# Patient Record
Sex: FEMALE | Race: WHITE | NOT HISPANIC OR LATINO | URBAN - METROPOLITAN AREA
[De-identification: names, ages, dates, MRNs, and addresses within clinical notes are randomized per-mention and may not be internally consistent; named-entity substitution may affect disease eponyms.]

---

## 2019-04-15 ENCOUNTER — HOSPITAL ENCOUNTER (OUTPATIENT)
Dept: LAB | Facility: HOSPITAL | Age: 44
Discharge: HOME | End: 2019-04-15
Attending: INTERNAL MEDICINE

## 2019-04-15 LAB
ALBUMIN SERPL-MCNC: 4 G/DL (ref 3.5–5)
ALBUMIN/GLOB SERPL: 1.5 {RATIO} (ref 1.4–2.6)
ALP SERPL-CCNC: 81 U/L (ref 42–98)
ALT SERPL-CCNC: 24 U/L (ref 10–40)
AMYLASE SERPL-CCNC: 55 U/L (ref 30–110)
ANION GAP SERPL CALC-SCNC: 16 MMOL/L (ref 8–19)
AST SERPL-CCNC: 17 U/L (ref 15–50)
BASOPHILS # BLD AUTO: 0.09 10*3/UL (ref 0–0.2)
BASOPHILS NFR BLD AUTO: 1.2 % (ref 0–3)
BILIRUB SERPL-MCNC: 0.43 MG/DL (ref 0.2–1.3)
BUN SERPL-MCNC: 10 MG/DL (ref 5–25)
BUN/CREAT SERPL: 12 {RATIO} (ref 6–20)
CALCIUM SERPL-MCNC: 8.6 MG/DL (ref 8.7–10.4)
CHLORIDE SERPL-SCNC: 104 MMOL/L (ref 99–111)
CONV ABS IMM GRAN: 0.03 10*3/UL (ref 0–0.2)
CONV CO2: 24 MMOL/L (ref 22–32)
CONV IMMATURE GRAN: 0.4 % (ref 0–1.8)
CONV TOTAL PROTEIN: 6.7 G/DL (ref 6.3–8.2)
CREAT UR-MCNC: 0.82 MG/DL (ref 0.5–0.9)
DEPRECATED RDW RBC AUTO: 43.9 FL (ref 36.4–46.3)
EOSINOPHIL # BLD AUTO: 0.15 10*3/UL (ref 0–0.7)
EOSINOPHIL # BLD AUTO: 1.9 % (ref 0–7)
ERYTHROCYTE [DISTWIDTH] IN BLOOD BY AUTOMATED COUNT: 12.9 % (ref 11.7–14.4)
ERYTHROCYTE [SEDIMENTATION RATE] IN BLOOD: 2 MM/H (ref 0–20)
GFR SERPLBLD BASED ON 1.73 SQ M-ARVRAT: >60 ML/MIN/{1.73_M2}
GLOBULIN UR ELPH-MCNC: 2.7 G/DL (ref 2–3.5)
GLUCOSE SERPL-MCNC: 87 MG/DL (ref 65–99)
HBA1C MFR BLD: 14.2 G/DL (ref 12–16)
HCT VFR BLD AUTO: 45.3 % (ref 37–47)
LIPASE SERPL-CCNC: 33 U/L (ref 5–51)
LYMPHOCYTES # BLD AUTO: 1.86 10*3/UL (ref 1–5)
MCH RBC QN AUTO: 28.9 PG (ref 27–31)
MCHC RBC AUTO-ENTMCNC: 31.3 G/DL (ref 33–37)
MCV RBC AUTO: 92.1 FL (ref 81–99)
MONOCYTES # BLD AUTO: 0.47 10*3/UL (ref 0.2–1.2)
MONOCYTES NFR BLD AUTO: 6 % (ref 3–10)
NEUTROPHILS # BLD AUTO: 5.21 10*3/UL (ref 2–8)
NEUTROPHILS NFR BLD AUTO: 66.7 % (ref 30–85)
NRBC CBCN: 0 % (ref 0–0.7)
OSMOLALITY SERPL CALC.SUM OF ELEC: 288 MOSM/KG (ref 273–304)
PLATELET # BLD AUTO: 350 10*3/UL (ref 130–400)
PMV BLD AUTO: 11.7 FL (ref 9.4–12.3)
POTASSIUM SERPL-SCNC: 4 MMOL/L (ref 3.5–5.3)
RBC # BLD AUTO: 4.92 10*6/UL (ref 4.2–5.4)
SODIUM SERPL-SCNC: 140 MMOL/L (ref 135–147)
VARIANT LYMPHS NFR BLD MANUAL: 23.8 % (ref 20–45)
WBC # BLD AUTO: 7.81 10*3/UL (ref 4.8–10.8)

## 2019-04-19 ENCOUNTER — CONVERSION ENCOUNTER (OUTPATIENT)
Dept: GASTROENTEROLOGY | Facility: CLINIC | Age: 44
End: 2019-04-19

## 2019-04-19 ENCOUNTER — OFFICE VISIT CONVERTED (OUTPATIENT)
Dept: GASTROENTEROLOGY | Facility: CLINIC | Age: 44
End: 2019-04-19
Attending: NURSE PRACTITIONER

## 2019-04-23 ENCOUNTER — HOSPITAL ENCOUNTER (OUTPATIENT)
Dept: LAB | Facility: HOSPITAL | Age: 44
Discharge: HOME | End: 2019-04-23

## 2019-04-26 LAB
CONV CELIAC DISEASE AB-IGA: 107 MG/DL (ref 68–408)
TTG IGA SER-ACNC: 1 U/ML (ref 0–3)

## 2019-05-06 ENCOUNTER — HOSPITAL ENCOUNTER (OUTPATIENT)
Dept: OTHER | Facility: HOSPITAL | Age: 44
Discharge: HOME | End: 2019-05-06
Attending: OBSTETRICS & GYNECOLOGY

## 2019-05-08 LAB — BACTERIA SPEC AEROBE CULT: NORMAL

## 2019-06-13 ENCOUNTER — CONVERSION ENCOUNTER (OUTPATIENT)
Dept: CARDIOLOGY | Facility: CLINIC | Age: 44
End: 2019-06-13

## 2019-06-13 ENCOUNTER — OFFICE VISIT CONVERTED (OUTPATIENT)
Dept: CARDIOLOGY | Facility: CLINIC | Age: 44
End: 2019-06-13
Attending: INTERNAL MEDICINE

## 2019-08-06 ENCOUNTER — HOSPITAL ENCOUNTER (OUTPATIENT)
Dept: DIABETES SERVICES | Facility: HOSPITAL | Age: 44
Setting detail: RECURRING SERIES
Discharge: HOME | End: 2019-08-31

## 2019-08-09 ENCOUNTER — HOSPITAL ENCOUNTER (OUTPATIENT)
Dept: GASTROENTEROLOGY | Facility: HOSPITAL | Age: 44
Setting detail: HOSPITAL OUTPATIENT SURGERY
Discharge: HOME | End: 2019-08-09
Attending: INTERNAL MEDICINE

## 2019-08-09 LAB
GLUCOSE BLD-MCNC: 75 MG/DL (ref 65–99)
HCG UR QL: NEGATIVE

## 2019-08-21 ENCOUNTER — HOSPITAL ENCOUNTER (OUTPATIENT)
Dept: GENERAL RADIOLOGY | Facility: HOSPITAL | Age: 44
Discharge: HOME | End: 2019-08-21
Attending: INTERNAL MEDICINE

## 2019-08-23 ENCOUNTER — HOSPITAL ENCOUNTER (OUTPATIENT)
Dept: CARDIOLOGY | Facility: HOSPITAL | Age: 44
Discharge: HOME | End: 2019-08-23
Attending: INTERNAL MEDICINE

## 2019-10-22 ENCOUNTER — OFFICE VISIT CONVERTED (OUTPATIENT)
Dept: ONCOLOGY | Facility: HOSPITAL | Age: 44
End: 2019-10-22
Attending: INTERNAL MEDICINE

## 2019-10-22 ENCOUNTER — HOSPITAL ENCOUNTER (OUTPATIENT)
Dept: ONCOLOGY | Facility: HOSPITAL | Age: 44
Discharge: HOME | End: 2019-10-22
Attending: INTERNAL MEDICINE

## 2019-12-10 ENCOUNTER — HOSPITAL ENCOUNTER (OUTPATIENT)
Dept: LAB | Facility: HOSPITAL | Age: 44
Discharge: HOME | End: 2019-12-10
Attending: INTERNAL MEDICINE

## 2019-12-10 LAB
ALBUMIN SERPL-MCNC: 4.2 G/DL (ref 3.5–5)
ALBUMIN/GLOB SERPL: 1.6 {RATIO} (ref 1.4–2.6)
ALP SERPL-CCNC: 80 U/L (ref 42–98)
ALT SERPL-CCNC: 26 U/L (ref 10–40)
ANION GAP SERPL CALC-SCNC: 22 MMOL/L (ref 8–19)
AST SERPL-CCNC: 17 U/L (ref 15–50)
BASOPHILS # BLD AUTO: 0.07 10*3/UL (ref 0–0.2)
BASOPHILS NFR BLD AUTO: 1.1 % (ref 0–3)
BILIRUB SERPL-MCNC: 0.57 MG/DL (ref 0.2–1.3)
BUN SERPL-MCNC: 13 MG/DL (ref 5–25)
BUN/CREAT SERPL: 15 {RATIO} (ref 6–20)
CALCIUM SERPL-MCNC: 9.4 MG/DL (ref 8.7–10.4)
CHLORIDE SERPL-SCNC: 106 MMOL/L (ref 99–111)
CHOLEST SERPL-MCNC: 170 MG/DL (ref 107–200)
CHOLEST/HDLC SERPL: 3.7 {RATIO} (ref 3–6)
CONV ABS IMM GRAN: 0.02 10*3/UL (ref 0–0.2)
CONV CO2: 20 MMOL/L (ref 22–32)
CONV IMMATURE GRAN: 0.3 % (ref 0–1.8)
CONV TOTAL PROTEIN: 6.9 G/DL (ref 6.3–8.2)
CREAT UR-MCNC: 0.84 MG/DL (ref 0.5–0.9)
DEPRECATED RDW RBC AUTO: 41.6 FL (ref 36.4–46.3)
EOSINOPHIL # BLD AUTO: 0.2 10*3/UL (ref 0–0.7)
EOSINOPHIL # BLD AUTO: 3.1 % (ref 0–7)
ERYTHROCYTE [DISTWIDTH] IN BLOOD BY AUTOMATED COUNT: 12 % (ref 11.7–14.4)
GFR SERPLBLD BASED ON 1.73 SQ M-ARVRAT: >60 ML/MIN/{1.73_M2}
GLOBULIN UR ELPH-MCNC: 2.7 G/DL (ref 2–3.5)
GLUCOSE SERPL-MCNC: 84 MG/DL (ref 65–99)
HCT VFR BLD AUTO: 43.6 % (ref 37–47)
HDLC SERPL-MCNC: 46 MG/DL (ref 40–60)
HGB BLD-MCNC: 13.9 G/DL (ref 12–16)
LDLC SERPL CALC-MCNC: 93 MG/DL (ref 70–100)
LYMPHOCYTES # BLD AUTO: 1.6 10*3/UL (ref 1–5)
LYMPHOCYTES NFR BLD AUTO: 25.2 % (ref 20–45)
MCH RBC QN AUTO: 30 PG (ref 27–31)
MCHC RBC AUTO-ENTMCNC: 31.9 G/DL (ref 33–37)
MCV RBC AUTO: 94 FL (ref 81–99)
MONOCYTES # BLD AUTO: 0.47 10*3/UL (ref 0.2–1.2)
MONOCYTES NFR BLD AUTO: 7.4 % (ref 3–10)
NEUTROPHILS # BLD AUTO: 3.99 10*3/UL (ref 2–8)
NEUTROPHILS NFR BLD AUTO: 62.9 % (ref 30–85)
NRBC CBCN: 0 % (ref 0–0.7)
OSMOLALITY SERPL CALC.SUM OF ELEC: 297 MOSM/KG (ref 273–304)
PLATELET # BLD AUTO: 292 10*3/UL (ref 130–400)
PMV BLD AUTO: 11.5 FL (ref 9.4–12.3)
POTASSIUM SERPL-SCNC: 4 MMOL/L (ref 3.5–5.3)
RBC # BLD AUTO: 4.64 10*6/UL (ref 4.2–5.4)
SODIUM SERPL-SCNC: 144 MMOL/L (ref 135–147)
TRIGL SERPL-MCNC: 157 MG/DL (ref 40–150)
TSH SERPL-ACNC: 4.67 M[IU]/L (ref 0.27–4.2)
VLDLC SERPL-MCNC: 31 MG/DL (ref 5–37)
WBC # BLD AUTO: 6.35 10*3/UL (ref 4.8–10.8)

## 2020-01-22 ENCOUNTER — HOSPITAL ENCOUNTER (OUTPATIENT)
Dept: ONCOLOGY | Facility: HOSPITAL | Age: 45
Discharge: HOME | End: 2020-01-22
Attending: INTERNAL MEDICINE

## 2020-01-22 ENCOUNTER — OFFICE VISIT CONVERTED (OUTPATIENT)
Dept: ONCOLOGY | Facility: HOSPITAL | Age: 45
End: 2020-01-22
Attending: INTERNAL MEDICINE

## 2020-03-25 ENCOUNTER — TELEMEDICINE CONVERTED (OUTPATIENT)
Dept: GASTROENTEROLOGY | Facility: CLINIC | Age: 45
End: 2020-03-25
Attending: NURSE PRACTITIONER

## 2020-06-25 ENCOUNTER — TELEPHONE CONVERTED (OUTPATIENT)
Dept: GASTROENTEROLOGY | Facility: CLINIC | Age: 45
End: 2020-06-25
Attending: NURSE PRACTITIONER

## 2020-09-02 ENCOUNTER — HOSPITAL ENCOUNTER (OUTPATIENT)
Dept: LAB | Facility: HOSPITAL | Age: 45
Discharge: HOME | End: 2020-09-02
Attending: INTERNAL MEDICINE

## 2020-09-02 LAB
25(OH)D3 SERPL-MCNC: 33.7 NG/ML (ref 30–100)
ALBUMIN SERPL-MCNC: 4 G/DL (ref 3.5–5)
ALBUMIN/GLOB SERPL: 1.6 {RATIO} (ref 1.4–2.6)
ALP SERPL-CCNC: 93 U/L (ref 42–98)
ALT SERPL-CCNC: 21 U/L (ref 10–40)
ANION GAP SERPL CALC-SCNC: 16 MMOL/L (ref 8–19)
AST SERPL-CCNC: 20 U/L (ref 15–50)
BASOPHILS # BLD AUTO: 0.08 10*3/UL (ref 0–0.2)
BASOPHILS NFR BLD AUTO: 1 % (ref 0–3)
BILIRUB SERPL-MCNC: 0.29 MG/DL (ref 0.2–1.3)
BUN SERPL-MCNC: 11 MG/DL (ref 5–25)
BUN/CREAT SERPL: 14 {RATIO} (ref 6–20)
CALCIUM SERPL-MCNC: 9.5 MG/DL (ref 8.7–10.4)
CHLORIDE SERPL-SCNC: 104 MMOL/L (ref 99–111)
CHOLEST SERPL-MCNC: 182 MG/DL (ref 107–200)
CHOLEST/HDLC SERPL: 3.9 {RATIO} (ref 3–6)
CONV ABS IMM GRAN: 0.02 10*3/UL (ref 0–0.2)
CONV CO2: 24 MMOL/L (ref 22–32)
CONV IMMATURE GRAN: 0.3 % (ref 0–1.8)
CONV TOTAL PROTEIN: 6.5 G/DL (ref 6.3–8.2)
CREAT UR-MCNC: 0.8 MG/DL (ref 0.5–0.9)
DEPRECATED RDW RBC AUTO: 43.1 FL (ref 36.4–46.3)
EOSINOPHIL # BLD AUTO: 0.24 10*3/UL (ref 0–0.7)
EOSINOPHIL # BLD AUTO: 3.1 % (ref 0–7)
ERYTHROCYTE [DISTWIDTH] IN BLOOD BY AUTOMATED COUNT: 13.1 % (ref 11.7–14.4)
GFR SERPLBLD BASED ON 1.73 SQ M-ARVRAT: >60 ML/MIN/{1.73_M2}
GLOBULIN UR ELPH-MCNC: 2.5 G/DL (ref 2–3.5)
GLUCOSE SERPL-MCNC: 83 MG/DL (ref 65–99)
HCT VFR BLD AUTO: 44.3 % (ref 37–47)
HDLC SERPL-MCNC: 47 MG/DL (ref 40–60)
HGB BLD-MCNC: 13.8 G/DL (ref 12–16)
LDLC SERPL CALC-MCNC: 102 MG/DL (ref 70–100)
LYMPHOCYTES # BLD AUTO: 1.98 10*3/UL (ref 1–5)
LYMPHOCYTES NFR BLD AUTO: 25.2 % (ref 20–45)
MCH RBC QN AUTO: 28 PG (ref 27–31)
MCHC RBC AUTO-ENTMCNC: 31.2 G/DL (ref 33–37)
MCV RBC AUTO: 89.9 FL (ref 81–99)
MONOCYTES # BLD AUTO: 0.49 10*3/UL (ref 0.2–1.2)
MONOCYTES NFR BLD AUTO: 6.2 % (ref 3–10)
NEUTROPHILS # BLD AUTO: 5.04 10*3/UL (ref 2–8)
NEUTROPHILS NFR BLD AUTO: 64.2 % (ref 30–85)
NRBC CBCN: 0 % (ref 0–0.7)
OSMOLALITY SERPL CALC.SUM OF ELEC: 289 MOSM/KG (ref 273–304)
PLATELET # BLD AUTO: 347 10*3/UL (ref 130–400)
PMV BLD AUTO: 11.3 FL (ref 9.4–12.3)
POTASSIUM SERPL-SCNC: 4.1 MMOL/L (ref 3.5–5.3)
RBC # BLD AUTO: 4.93 10*6/UL (ref 4.2–5.4)
SODIUM SERPL-SCNC: 140 MMOL/L (ref 135–147)
TRIGL SERPL-MCNC: 164 MG/DL (ref 40–150)
TSH SERPL-ACNC: 3.11 M[IU]/L (ref 0.27–4.2)
VLDLC SERPL-MCNC: 33 MG/DL (ref 5–37)
WBC # BLD AUTO: 7.85 10*3/UL (ref 4.8–10.8)

## 2020-09-08 ENCOUNTER — OFFICE VISIT CONVERTED (OUTPATIENT)
Dept: ORTHOPEDIC SURGERY | Facility: CLINIC | Age: 45
End: 2020-09-08
Attending: ORTHOPAEDIC SURGERY

## 2020-09-14 ENCOUNTER — HOSPITAL ENCOUNTER (OUTPATIENT)
Dept: CARDIOLOGY | Facility: HOSPITAL | Age: 45
Discharge: HOME | End: 2020-09-14
Attending: INTERNAL MEDICINE

## 2020-11-10 ENCOUNTER — OFFICE VISIT CONVERTED (OUTPATIENT)
Dept: ORTHOPEDIC SURGERY | Facility: CLINIC | Age: 45
End: 2020-11-10
Attending: ORTHOPAEDIC SURGERY

## 2020-11-10 ENCOUNTER — CONVERSION ENCOUNTER (OUTPATIENT)
Dept: ORTHOPEDIC SURGERY | Facility: CLINIC | Age: 45
End: 2020-11-10

## 2021-03-24 ENCOUNTER — OFFICE VISIT CONVERTED (OUTPATIENT)
Dept: GASTROENTEROLOGY | Facility: CLINIC | Age: 46
End: 2021-03-24
Attending: NURSE PRACTITIONER

## 2021-05-10 NOTE — H&P
History and Physical      Patient Name: Norma Cabrales   Patient ID: [id number]   Sex: Female   YOB: 1975    Primary Care Provider: Rajeev Bello MD   Referring Provider: Shwetha INFANTE    Visit Date: September 8, 2020    Provider: Casey Jang MD   Location: [facility]   Location Address: [address] [place] [address]  [id number]   Location Phone: [phone]          Chief Complaint  · Left wrist cyst pain       History Of Present Illness  Norma Cabrales is a 44 year old /White female who presents today to [facility].      Patient presents here today for evaluation of her Left wrist. Patient has a cyst on her wrist for about a year.  She states she has 5 blood clots in her left arm from a previous medical procedure.  She has been cleared from a blood specialists and is not taking any blood thinner for about 6 months. She otherwise is feeling well, no other complaints at this time.       Past Medical History  Abdominal pain; Blood Diseases; Bowel Disease; Irritable bowel syndrome; Protein C deficiency; Seasonal allergies; Seizures         Past Surgical History  Cholecystectomy; Colonoscopy; EGD; Gallbladder         Medication List  Amitiza 8 mcg oral capsule; aspirin 81 mg Oral Tablet, Chewable; Benadryl 25 mg oral capsule; folic acid 400 mcg oral tablet; hyoscyamine sulfate 0.375 mg oral tablet extended release 12 hr; lansoprazole 15 mg oral capsule,delayed release(DR/EC); Metamucil 0.4 gram oral capsule; Vitamin B-12 1,000 mcg/mL oral drops         Allergy List  NO KNOWN DRUG ALLERGIES       Allergies Reconciled  Family Medical History  Cancer, Unspecified; Diabetes, unspecified type; Breast Neoplasm; Kidney Cancer; No family history of colorectal cancer; Family history of Arthritis         Social History  Alcohol (Never); Alcohol Use (Never); Claustophobic (Unknown); lives with children; lives with spouse; ; .; Recreational  "Drug Use (Never); Tobacco (Never); Working         Review of Systems  · Constitutional  o Denies  o : fever, chills, weight loss  · Cardiovascular  o Denies  o : chest pain, shortness of breath  · Gastrointestinal  o Denies  o : liver disease, heartburn, nausea, blood in stools  · Genitourinary  o Denies  o : painful urination, blood in urine  · Integument  o Denies  o : rash, itching  · Neurologic  o Denies  o : headache, weakness, loss of consciousness  · Musculoskeletal  o Denies  o : painful, swollen joints  · Psychiatric  o Denies  o : drug/alcohol addiction, anxiety, depression      Vitals  Date Time BP Position Site L\R Cuff Size HR RR TEMP (F) WT  HT  BMI kg/m2 BSA m2 O2 Sat HC       09/08/2020 09:50 AM      80 - R   158lbs 6oz 5'  6\" 25.56 1.83 98 %          Physical Examination  · Constitutional  o Appearance  o : well developed, well-nourished, no obvious deformities present  · Head and Face  o Head  o :   § Inspection  § : normocephalic  o Face  o :   § Inspection  § : no facial lesions  · Eyes  o Conjunctivae  o : conjunctivae normal  o Sclerae  o : sclerae white  · Ears, Nose, Mouth and Throat  o Ears  o :   § External Ears  § : appearance within normal limits  § Hearing  § : intact  o Nose  o :   § External Nose  § : appearance normal  · Neck  o Inspection/Palpation  o : normal appearance  o Range of Motion  o : full range of motion  · Respiratory  o Respiratory Effort  o : breathing unlabored  o Inspection of Chest  o : normal appearance  o Auscultation of Lungs  o : no audible wheezing or rales  · Cardiovascular  o Heart  o : regular rate  · Gastrointestinal  o Abdominal Examination  o : soft and non-tender  · Skin and Subcutaneous Tissue  o General Inspection  o : intact, no rashes  · Psychiatric  o General  o : Alert and oriented x3  o Judgement and Insight  o : judgment and insight intact  o Mood and Affect  o : mood normal, affect appropriate  · Left Wrist  o Inspection  o : ganglion cyst 2cm by " 1 cm, nontender and mobile. mild pain with extension, flexion 75 degrees; extension: 85 degrees. Neurovascularly intact. skin intact.   · Injection Note/Aspiration Note  o Site  o : left wrist  o Procedure  o : Procedure: After educating the patient, patient gave consent for procedure. After using Chloraprep, the joint space was injected. The patient tolerated the procedure well.  o Medication  o : left wrist aspirated prior to injecting 80 mg of DepoMedrol with 1cc of 1% Lidocaine          Assessment  · Left wrist pain     719.43/M25.532  · Ganglion cyst of wrist, left     727.41/M67.432      Plan  · Orders  o Depo-Medrol injection 80mg () - - 09/08/2020   Lot [id number] Exp 07 2021 [employer] Administered by BONIFACIO NOYOLA MD  o Arthrocentesis of wrist (20605) - - 09/08/2020   Lot [id number] Exp 08 01 2021 [employer] Administered by BONIFACIO NOYOLA MD  · Medications  o Medications have been Reconciled  o Transition of Care or Provider Policy  · Instructions  o Reviewed the patient's Past Medical, Social, and Family history as well as the ROS at today's visit, no changes.  o Call or return if worsening symptoms.  o Follow up in 6 weeks.  o The above service was scribed by Cherise Galvez on my behalf and I attest to the accuracy of the note. jsb  o Discussed conservative treatment with the patient involving aspiration and injection vs operative treatment. Due to her blood clots, we recommend aspiration and injections before excision of ganglion cyst. Discussed the risks and benefits of aspiration and steroid injection. The patient expressed understanding and wished to proceed with aspiration and injection of the cyst. The patient will follow up in 6 weeks with us to recheck.   o Electronically Identified Patient Education Materials Provided Electronically            Electronically Signed by: Cherise Galvez MA -Author on September 9, 2020 11:04:03 AM  Electronically Co-signed by: Bonifacio GARCIAS  MD Suhail -Reviewer on September 9, 2020 10:14:50 PM

## 2021-05-12 ENCOUNTER — HOSPITAL ENCOUNTER (OUTPATIENT)
Dept: NUCLEAR MEDICINE | Facility: HOSPITAL | Age: 46
Discharge: HOME | End: 2021-05-12
Attending: NURSE PRACTITIONER

## 2021-05-13 NOTE — PROGRESS NOTES
"   Progress Note      Patient Name: Norma Cabrales   Patient ID: [id number]   Sex: Female   YOB: 1975    Primary Care Provider: Rajeev Bello MD   Referring Provider: Shwetha INFANTE    Visit Date: November 10, 2020    Provider: Casey Jang MD   Location: [facility]   Location Address: [address] [place] [address]  [id number]   Location Phone: [phone]          Chief Complaint  · Left wrist      History Of Present Illness  Norma Cabrales is a 44 year old /White female who presents today to [facility]. Patient presents for follow-up evaluation of left wrist ganglion cyst. Patient was evaluated on September 8 when she had a aspiration and injection of the left wrist dorsal ganglion cyst. Patient states the ganglion cyst has been there for over a year, she states the aspiration injection helped and it is reduced in size but states that about a week after the aspiration and injection she states it started coming back. Patient states that smaller than it used to be but still continues to grow. Patient states she had a colonoscopy and when she had the procedure she had swelling to her left upper extremity and was found to have blood clots in her left upper extremity. Patient denies being on a blood thinner at this time. Patient denies pain in the wrist but states it is \"annoying\" at times.       Past Medical History  Abdominal pain; Blood Diseases; Bowel Disease; Irritable bowel syndrome; Protein C deficiency; Seasonal allergies; Seizures         Past Surgical History  Cholecystectomy; Colonoscopy; EGD; Gallbladder         Medication List  Amitiza 8 mcg oral capsule; aspirin 81 mg Oral Tablet, Chewable; Benadryl 25 mg oral capsule; folic acid 400 mcg oral tablet; hyoscyamine sulfate 0.375 mg oral tablet extended release 12 hr; lansoprazole 15 mg oral capsule,delayed release(DR/EC); Metamucil 0.4 gram oral capsule; Vitamin B-12 1,000 mcg/mL oral " "drops         Allergy List  NO KNOWN DRUG ALLERGIES       Allergies Reconciled  Family Medical History  Cancer, Unspecified; Diabetes, unspecified type; Breast Neoplasm; Kidney Cancer; No family history of colorectal cancer; Family history of Arthritis         Social History  Alcohol (Never); Alcohol Use (Never); Claustophobic (Unknown); lives with children; lives with spouse; ; .; Recreational Drug Use (Never); Tobacco (Never); Working         Review of Systems  · Constitutional  o Denies  o : fever, chills, weight loss  · Cardiovascular  o Denies  o : chest pain, shortness of breath  · Gastrointestinal  o Denies  o : liver disease, heartburn, nausea, blood in stools  · Genitourinary  o Denies  o : painful urination, blood in urine  · Integument  o Denies  o : rash, itching  · Neurologic  o Denies  o : headache, weakness, loss of consciousness  · Musculoskeletal  o Denies  o : painful, swollen joints  · Psychiatric  o Denies  o : drug/alcohol addiction, anxiety, depression      Vitals  Date Time BP Position Site L\R Cuff Size HR RR TEMP (F) WT  HT  BMI kg/m2 BSA m2 O2 Sat FR L/min FiO2        11/10/2020 03:01 PM         145lbs 0oz 5'  6.25\" 23.23 1.75             Physical Examination  · Constitutional  o Appearance  o : well developed, well-nourished, no obvious deformities present  · Head and Face  o Head  o :   § Inspection  § : normocephalic  o Face  o :   § Inspection  § : no facial lesions  · Eyes  o Conjunctivae  o : conjunctivae normal  o Sclerae  o : sclerae white  · Ears, Nose, Mouth and Throat  o Ears  o :   § External Ears  § : appearance within normal limits  § Hearing  § : intact  o Nose  o :   § External Nose  § : appearance normal  · Neck  o Inspection/Palpation  o : normal appearance  o Range of Motion  o : full range of motion  · Respiratory  o Respiratory Effort  o : breathing unlabored  o Inspection of Chest  o : normal appearance  o Auscultation of Lungs  o : no audible wheezing " or rales  · Cardiovascular  o Heart  o : regular rate  · Gastrointestinal  o Abdominal Examination  o : soft and non-tender  · Skin and Subcutaneous Tissue  o General Inspection  o : intact, no rashes  · Psychiatric  o General  o : Alert and oriented x3  o Judgement and Insight  o : judgment and insight intact  o Mood and Affect  o : mood normal, affect appropriate  · Left Wrist  o Inspection  o : ganglion cyst 1 cm x 1 cm at the dorsum of the wrist, nontender and mobile. mild pain with extension, flexion 75 degrees; extension: 85 degrees. Neurovascularly intact. skin intact.          Assessment  · Left Wrist Ganglion cyst     727.43/M67.40      Plan  · Instructions  o Reviewed the patient's Past Medical, Social, and Family history as well as the ROS at today's visit, no changes.  o Call or return if worsening symptoms.  o Follow up as needed.  o The above service was scribed by Omero Zamora PA-C on my behalf and I attest to the accuracy of the note. jsb  o Discussed diagnosis and treatment options, patient was advised to talk with the hematologist for clearance for possible surgery. Patient states she will continue to watch it and follow-up as needed when she decides what she wants to do.            Electronically Signed by: Arya Zamora PA-C -Author on November 10, 2020 05:21:27 PM  Electronically Co-signed by: Casey Jang MD -Reviewer on November 10, 2020 08:53:13 PM

## 2021-05-13 NOTE — PROGRESS NOTES
Progress Note      Patient Name: Norma Cabrales   Patient ID: [id number]   Sex: Female   YOB: 1975    Primary Care Provider: Rajeev Bello MD   Referring Provider: Shwetha INFANTE    Visit Date: June 25, 2020    Provider: NARESH Reed   Location: [facility]   Location Address: [address] [place] [address]  [id number]   Location Phone: [phone]          Chief Complaint  · IBS follow up       History Of Present Illness  TELEHEALTH TELEPHONE VISIT  Chief Complaint: PT states it's a follow up IBS. No issue at this time.   Norma Cabrales is a 44 year old /White female who is presenting for evaluation via telehealth telephone visit. Verbal consent obtained before beginning visit.   Provider spent 14 minutes with the patient during the telehealth visit.   The following staff were present during this visit: Hayde RODRIGUEZ; Demarcus Gloria MA   Past Medical History/ Overview of Patient Symptoms     Due to the national emergency of COVID-19, all visits are being performed via telehealth where possible.    Patient initially presented April 2019 with abdominal pain, reported intolerance to gluten, dairy, yeast; patient had recent labs that were negative, patient also reported history of IBS C and that probiotics and MiraLAX worsen symptoms. Pt had CT 9/2018 that was negative, done for chronic sx of abd pain.   Celiac serology 4/23/2019 negative  PMH sig for Protein C deficiency--was put on Eliquis after developing clot in left arm after scopes.     EGD colonoscopy 8/9/2019: Medium-sized hiatal hernia, otherwise negative EGDduodenal biopsies with mildly increased intraepithelial lymphocytes, nonspecific findings, stomach biopsy with chronic inactive gastritis, esophageal biopsy with chronic inflammation; good prep, negative colonoscopy    Patient was last seen March 2020 complaining of chronic abdominal pain and spasms in her colon, complained of  "constipation and hard stools.  Hyoscyamine was prescribed and Colace.  Pt states hyoscyamine has helped, describes still spasms and \"insides vibrating\" pain usually above umbilicus and can radiate to LLQ, states bowels more regular w Colace. Tried Benefiber felt made her sick, doing Metamucil tablets and tolerates better. Pt states she missed 1 dose of Colace and abd pain really increased. She is taking Hyoscyamine 0.375 scheduled BID. Still w some discomfort but better.       Past Medical History  Abdominal pain; Irritable bowel syndrome; Protein C deficiency; Seizures         Past Surgical History  Cholecystectomy; Colonoscopy; EGD         Medication List  Name Date Started Instructions   aspirin 81 mg Oral Tablet, Chewable  chew 1 tablet (81 mg) by oral route once daily   Benadryl 25 mg oral capsule  take 1-2 capsules by oral route daily    mg oral capsule 03/25/2020 take 1 capsule (100 mg) by oral route 2 times per day for 30 days   Eliquis 5 mg oral tablet  Follow instructions from provider   folic acid 400 mcg oral tablet  take 1 tablet (0.4 mg) by oral route once daily   hyoscyamine sulfate 0.375 mg oral tablet extended release 12 hr 06/25/2020 take 1 tablet (0.375 mg) by oral route every 12 hours for 30 days   lansoprazole 15 mg oral capsule,delayed release(DR/EC)  take 1 capsule by oral route daily   Metamucil 0.4 gram oral capsule  --    Vitamin B-12 1,000 mcg/mL oral drops  take 1 drop by oral route daily         Allergy List  NO KNOWN DRUG ALLERGIES       Allergies Reconciled  Family Medical History  Breast Neoplasm; Kidney Cancer; No family history of colorectal cancer         Social History  Alcohol (Never); lives with children; lives with spouse; ; Tobacco (Never); Working             Assessment  · Irritable bowel syndrome     564.1/K58.9  · Constipation     564.00/K59.00      Plan  · Medications  o Amitiza 8 mcg oral capsule   SIG: take 1 capsule (8 mcg) by oral route 2 times per day " with food and water for 30 days   DISP: (60) capsules with 3 refills  Prescribed on 06/25/2020     o hyoscyamine sulfate 0.375 mg oral tablet extended release 12 hr   SIG: take 1 tablet (0.375 mg) by oral route every 12 hours for 30 days   DISP: (60) tablets with 5 refills  Refilled on 06/25/2020     o  mg oral capsule   SIG: take 1 capsule (100 mg) by oral route 2 times per day for 30 days   DISP: (60) capsules with 3 refills  Discontinued on 06/25/2020     o Medications have been Reconciled  o Transition of Care or Provider Policy  · Instructions  o f/u 3 months  o As pt had an improvement in pain w Colace and w Hyoscyamine, I think she would really improve with a med for IBS-C. D/W pt today trying Amitiza, she may continue to use Hyoscyamine as needed.            Electronically Signed by: NARESH Reed -Author on June 25, 2020 10:22:12 AM

## 2021-05-14 VITALS
SYSTOLIC BLOOD PRESSURE: 110 MMHG | HEART RATE: 75 BPM | WEIGHT: 158 LBS | TEMPERATURE: 98.2 F | DIASTOLIC BLOOD PRESSURE: 80 MMHG | BODY MASS INDEX: 25.39 KG/M2 | HEIGHT: 66 IN

## 2021-05-14 VITALS — HEART RATE: 80 BPM | OXYGEN SATURATION: 98 % | BODY MASS INDEX: 25.45 KG/M2 | WEIGHT: 158.37 LBS | HEIGHT: 66 IN

## 2021-05-14 VITALS — WEIGHT: 145 LBS | HEIGHT: 66 IN | BODY MASS INDEX: 23.3 KG/M2

## 2021-05-14 NOTE — PROGRESS NOTES
"   Progress Note      Patient Name: Norma Cabrales   Patient ID: [id number]   Sex: Female   YOB: 1975    Primary Care Provider: Rajeev Bello MD   Referring Provider: Shwetha INFANTE    Visit Date: March 24, 2021    Provider: NARESH Reed   Location: [facility]   Location Address: [address] [place] [address]  [id number]   Location Phone: [phone]          Chief Complaint  · Dysphagia  · Chest pain      History Of Present Illness  Norma Cabrales is a 45 year old /White female who presents to the office today.      Patient initially presented April 2019 with abdominal pain, reported intolerance to gluten, dairy, yeast; patient had recent labs that were negative, patient also reported history of IBS C and that probiotics and MiraLAX worsen symptoms. Pt had CT 9/2018 that was negative, done for chronic sx of abd pain.   Celiac serology 4/23/2019 negative  PMH sig for Protein C deficiency--was put on Eliquis after developing clot in left arm after scopes.     EGD colonoscopy 8/9/2019: Medium-sized hiatal hernia, otherwise negative EGDduodenal biopsies with mildly increased intraepithelial lymphocytes, nonspecific findings, stomach biopsy with chronic inactive gastritis, esophageal biopsy with chronic inflammation; good prep, negative colonoscopy      Pt was last seen June 2020 reported better w Colace and Hyoscyamine. Amitiza 8 Rx'd, pt has not f/u since.    Today, pt states she is having more abd pain, feels different than before. c/o abd bloating/swelling that is not improving, pt states when clothes hit her stomach in epigastric area it hurts, all around her abd now at times. Feels chest area also sore. Feels sx worsened about a month ago. Denies acid reflux and HB sx. States when she swallows she has a hard time with initial swallow, she has to make conscious effort to swallow.   Pt states she has alwasy had \"digestive pain\" all day " "every day x years and waxes/wanes. I asked if pain related to meals and she says \"yes and no\" and has been taking Aleve.   Pt states she takes hyoscyamine once/day, 30 min before meal, feels she may need a little more often. States she has \"spasms\" describes as pulsating sx.  Pt states she never was able to get Amitiza with insurance issues. Having daily BM, states stool softener casued pain, states [place] #2-4 but does have pain w BM and straining. Pt has tried and failed Colace and Miralax.  Pt states off Eliquis now, cleared by hematology.       Past Medical History  Abdominal pain; Blood Diseases; Bowel Disease; Irritable bowel syndrome; Protein C deficiency; Seasonal allergies; Seizures         Past Surgical History  Cholecystectomy; Colonoscopy; EGD; Gallbladder         Medication List  Name Date Started Instructions   Amitiza 8 mcg oral capsule 03/24/2021 take 1 capsule (8 mcg) by oral route 2 times per day with food and water for 30 days   aspirin 81 mg Oral Tablet, Chewable  chew 1 tablet (81 mg) by oral route once daily   Benadryl 25 mg oral capsule  take 1-2 capsules by oral route daily   folic acid 400 mcg oral tablet  take 1 tablet (0.4 mg) by oral route once daily   hyoscyamine sulfate 0.375 mg oral tablet extended release 12 hr 06/25/2020 take 1 tablet (0.375 mg) by oral route every 12 hours for 30 days   lansoprazole 15 mg oral capsule,delayed release(DR/EC)  take 1 capsule by oral route daily   Vitamin B-12 1,000 mcg/mL oral drops  take 1 drop by oral route daily         Allergy List  NO KNOWN DRUG ALLERGIES       Allergies Reconciled  Family Medical History  Cancer, Unspecified; Diabetes, unspecified type; Breast Neoplasm; Kidney Cancer; No family history of colorectal cancer; Family history of Arthritis         Social History  Alcohol (Never); Alcohol Use (Never); Claustophobic (Unknown); lives with children; lives with spouse; ; .; Recreational Drug Use (Never); Tobacco (Never); " "Working         Review of Systems  · Constitutional  o Admits  o : good general health lately, no acute distress  · Gastrointestinal  o Denies  o : additional gastrointestinal symptoms except as noted in the HPI  · Psychiatric  o Admits  o : pleasant affect      Vitals  Date Time BP Position Site L\R Cuff Size HR RR TEMP (F) WT  HT  BMI kg/m2 BSA m2 O2 Sat FR L/min FiO2 HC       11/10/2020 03:01 PM         145lbs 0oz 5'  6.25\" 23.23 1.75       03/24/2021 09:41 /80 Sitting    75 - R  98.2 157lbs 16oz 5'  6.25\" 25.31 1.83             Physical Examination  · Constitutional  o Appearance  o : well developed, well-nourished, in no acute distress  · Head and Face  o Head  o :   § Inspection  § : atraumatic, normocephalic  · Eyes  o Sclerae  o : sclerae white, no sclerae icterus  · Neck  o Inspection/Palpation  o : supple  · Respiratory  o Respiratory Effort  o : breathing unlabored  o Inspection of Chest  o : normal appearance, no retractions  · Cardiovascular  o Peripheral Vascular System  o :   § Extremities  § : no cyanosis, clubbing or edema  · Gastrointestinal  o Abdominal Examination  o : soft, nontender to palpation--generalized tenderness  · Skin and Subcutaneous Tissue  o General Inspection  o : no lesions present, no rashes present  · Neurologic  o Mental Status Examination  o :   § Orientation  § : grossly oriented to person, place and time  § Speech/Language  § : communication ability within normal limits, voice quality normal, articulation of speech normal, no evidence of aphasia  § Attention  § : attention normal, concentration abilities normal  o Sensation  o : grossly intact  o Gait and Station  o :   § Gait Screening  § : normal gait  · Psychiatric  o General  o : Alert and oriented x3  o Mood and Affect  o : Mood and affect are appropriate to circumstances          Assessment  · Pre-op exam     V72.84/Z01.818  · Abdominal " pain     789.00/R10.9  worsening  · Constipation     564.00/K59.00  · Heartburn     787.1/R12  · Irritable bowel syndrome     564.1/K58.9      Plan  · Orders  o CBC with Auto Diff [facility] (31816) - - 03/24/2021  o CMP [facility] ([id number]) - - 03/24/2021  o Magnesium, serum (70262) - - 03/24/2021  o [facility] Pre-Op Covid-19 Screening (44468) - - 03/24/2021  · Medications  o Medications have been Reconciled  o Transition of Care or Provider Policy  · Instructions  o Please Sign Permit for: EGD  o Indication: dysphagia abd pain  o Surgical Risk and Benefits: Possible risks/complications, benefits, and alternatives to surgical or invasive procedure have been explained to patient and/or legal guardian; Patient has been evaluated and can tolerate anesthesia and/or sedation. Risks, benefits, and alternatives to anesthesia and sedation have been explained to patient and/or legal guardian.  o Follow Up after Procedure.  o Encouraged to follow-up with Primary Care Provider for preventative care.  o Patient was educated/instructed on their diagnosis, treatment and medications prior to discharge from the clinic today.  o Patient instructed to seek medical attention urgently for new or worsening symptoms.  o Pt concerned about EGD, hx phlebitis/DVT in left arm after EGD. Advised her to notify anesthesia at EGD. Also can notify hematology prior to EGD.  o Amitiza 8 mcg BID Rx'd from facesheet and samples given today   o Call if not improving and will do CT.             Electronically Signed by: NARESH Reed -Author on March 24, 2021 01:11:09 PM

## 2021-05-15 VITALS
DIASTOLIC BLOOD PRESSURE: 81 MMHG | WEIGHT: 149 LBS | SYSTOLIC BLOOD PRESSURE: 109 MMHG | HEART RATE: 78 BPM | TEMPERATURE: 98.5 F | BODY MASS INDEX: 23.95 KG/M2 | HEIGHT: 66 IN

## 2021-05-15 VITALS
BODY MASS INDEX: 23.3 KG/M2 | WEIGHT: 145 LBS | SYSTOLIC BLOOD PRESSURE: 100 MMHG | HEART RATE: 71 BPM | DIASTOLIC BLOOD PRESSURE: 76 MMHG | HEIGHT: 66 IN

## 2021-05-28 VITALS
RESPIRATION RATE: 16 BRPM | BODY MASS INDEX: 24.27 KG/M2 | HEIGHT: 66 IN | DIASTOLIC BLOOD PRESSURE: 68 MMHG | TEMPERATURE: 97.7 F | BODY MASS INDEX: 24.74 KG/M2 | TEMPERATURE: 98.4 F | RESPIRATION RATE: 14 BRPM | HEART RATE: 63 BPM | SYSTOLIC BLOOD PRESSURE: 102 MMHG | SYSTOLIC BLOOD PRESSURE: 107 MMHG | DIASTOLIC BLOOD PRESSURE: 72 MMHG | WEIGHT: 151.01 LBS | OXYGEN SATURATION: 100 % | HEART RATE: 68 BPM | WEIGHT: 152.12 LBS | OXYGEN SATURATION: 100 %

## 2021-05-28 NOTE — PROGRESS NOTES
Patient: DUNIA LEMUS     Acct: [id number]     Report: #[id number]  UNIT #: [id number]     : 1975    Encounter Date:2020  PRIMARY CARE: Remi Bello  ***Signed***  --------------------------------------------------------------------------------------------------------------------  NURSE INTAKE      Visit Type      Established Patient Visit            Chief Complaint      CLOTTING DISORDER FOLLOW UP            Referring Provider/Copies To      Referring Provider:  Remi Bello      Primary Care Provider:  Remi Bello      Copies To:   Remi Bello            History and Present Illness      HPI - Oncology Interim      Patient returns for follow-up of left upper extremity DVT.  This is felt to be     related to an endoscopic procedure that she had shortly before symptoms     developed.  She is now on anticoagulation for 6 months with Eliquis.  She does     have a history of protein C deficiency diagnosed in .  No other clotting     events.  She denies excessive bruising or bleeding with exception of heavier     menstrual cycle since she started the blood thinners.  Her energy level is good.     She notes some slight discomfort from the arm especially when exposed to cold.     given that this was a provoked clot, I think it would be reasonable to allow her    to come off of anticoagulation.  She can finish her current bottle and then     stop.  A baby aspirin daily is reasonable.  We discussed lifestyle modifications    such as routine exercise, foot and lower extremity exercises while sitting at     her work desk frequent stops during travel.  She can follow-up with her primary     care provider.  I am happy to see her back at any point should additional     questions or concerns arise.  If the menstrual cycle remains heavy, she should     follow-up with GYN.            PAST, FAMILY   Past Medical History      Past Medical History:  Bleeding Condition (Protein C deficiency), Seizures  (AS A    CHILD)            Past Surgical History      Biopsy, Cholecystectomy            Upper and lower endoscopy            Family History      Family History:  Kidney Cancer (FATHER)            Cousin with protein C deficiency            Social History      Marital Status:        Lives independently:  Yes      Number of Children:  2      Occupation:  [employer]            Tobacco Use      Tobacco status:  Never smoker            Alcohol Use      Alcohol intake:  None            Substance Use      Substance use:  Denies use            REVIEW OF SYSTEMS      General:  Admits: Fatigue      ENT:  Denies Headache      Cardiovascular:  Denies Chest Pain      Respiratory:  Denies: Productive Cough, Shortness of Air      Gastrointestinal:  Admits Constipation; Denies Diarrhea      Genitourinary:  Denies Blood in Urine      Musculoskeletal:  Denies Back Pain, Denies Muscle Pain      Neurologic:  Denies Numbness\Tingling            VITAL SIGNS AND SCORES      Vitals      Weight 152 lbs 1.878 oz / 69 kg      Temperature 98.4 F / 36.89 C - Temporal      Pulse 63      Respirations 16      Blood Pressure 102/68 Sitting, Left Arm      Pulse Oximetry 100%, RM AIR            Pain Score      Pain Scale Used:  Numerical      Pain Intensity:  0            Fatigue Score      Fatigue (0-10 scale):  4            EXAM      General Appearance:  Positive for: Alert, Oriented x3, Cooperative;          Negative for: Acute Distress      Respiratory:  Positive for: CTAB, Normal Respiratory Effort      Abdomen/Gastro:  Positive for: Normal Active Bowel Sounds, Soft;          Negative for: Tenderness      Cardiovascular:  Positive for: RRR;          Negative for: Gallop, Murmur, Peripheral Edema, Rub      Psychiatric:  Positive for: Appropriate Affect, Intact Judgement            PREVENTION      Hx Influenza Vaccination:  Yes      Date Influenza Vaccine Given:  Nov 1, 2019      2 or More Falls Past Year?:  No      Fall Past  Year with Injury?:  No      Hx Pneumococcal Vaccination:  No      Encouraged to follow-up with:  PCP regarding preventative exams.      Chart initiated by      SAHIL JAVIER MA            ALLERGY/MEDS      Allergies      Coded Allergies:             *No Known Allergies (Verified  Allergy, Unknown, 1/22/20)            Medications      Last Reconciled on 1/22/20 16:18 by PILO BAILEY      Apixaban (Eliquis) 5 Mg Tablet      5 MG PO BID for 30 Days, #60 TAB         Reported         10/22/19       Folic Acid (Folic Acid*) 0.4 Mg Tablet      1600 MCG PO BID, TAB         Reported         8/9/19       (Vitamin B12)   No Conflict Check               Reported         8/9/19       Omeprazole/Sodium Bicarbonate (Zegerid 40 Mg Capsule) 1 Cap Cap      1 CAP PO DAILY, CAP         Reported         3/5/12       Aspirin EC (Aspirin EC) 81 Mg Tabec      1 TAB PO QDAY, TAB 0 Refills         Reported         7/22/09      Medications Reviewed:  No Changes made to meds            IMPRESSION/PLAN      Diagnosis      Deep venous thrombosis of left upper extremity         Acute deep vein thrombosis (DVT) of left upper extremity, unspecified vein         Affected thrombotic vein of extremity: unspecified vein of extremity         Chronicity: acute            Notes      given that this was a provoked clot, I think it would be reasonable to allow her    to come off of anticoagulation.  She can finish her current bottle and then     stop.  A baby aspirin daily is reasonable.  We discussed lifestyle modifications    such as routine exercise, foot and lower extremity exercises while sitting at     her work desk frequent stops during travel.  She can follow-up with her primary     care provider.  I am happy to see her back at any point should additional     questions or concerns arise.  If the menstrual cycle remains heavy, she should     follow-up with GYN.            Patient Education            Apixaban      Patient Education Provided:  Yes             Electronically signed by PILO BAILEY  01/22/2020 16:18       Disclaimer: Converted document may not contain table formatting or lab diagrams. Please see [employer] for the authenticated document.

## 2021-05-28 NOTE — PROGRESS NOTES
Patient: DUNIA LEMUS     Acct: [id number]     Report: #[id number]  UNIT #: [id number]     : 1975    Encounter Date:10/22/2019  PRIMARY CARE: Remi Bello  ***Signed***  --------------------------------------------------------------------------------------------------------------------  NURSE INTAKE      Visit Type      New Patient Visit            Chief Complaint      GOT 5 BLOOD CLOTS L ARM FROM PROCEDURE            Referring Provider/Copies To      Referring Provider:  Remi Bello      Primary Care Provider:  Remi Bello            History and Present Illness      HPI - Hematology Interim      43-year-old white female with known protein C deficiency who presents today for     discussion of left upper extremity DVT.  Patient states that she had anesthesia     with endoscopy in August of this year.  A few days later while swimming she     noticed left arm pain and swelling.  She subsequently presented to the emergency    room on 2019.  Doppler ultrasound of the left arm demonstrated DVT.  She     was started on Eliquis which she has been on since that time.  She states the     arm is pretty much back to normal she does not have any pain, swelling or     redness.  She is taking her Eliquis as prescribed without difficulty such as exc    essive bruising or bleeding.  She has known protein C deficiency from being     tested in  after her cousin was found to have the same.  She has not had     prior finding events.  She is not a smoker.  She has completed childbearing and     is not on birth control or hormone replacement.            Most Recent Imaging Findings       LOCATION:[facility]     : 1975                                           --                                                                                           --                                      PROVIDERS                                            -- ADMITTING:     ATTENDING:                                             -- FAMILY:  Remi Bello   ORDERING:  NATHAN LEACH                                       -- OTHER:    DICTATING:  VINCENZO Mccain MD                                           --                                                                                           -- REQ #:[id number]   EXAM:VEUEL - VENOUS EVAL UPPER LIMITED                                 -- REASON FOR EXAM:  Swelling                                           -- REASON FOR VISIT:  LEFT ARM COMPLAINT                                           --                                                                                           -- *******Signed******                                              -- PROCEDURE:   VENOUS EVAL UPPER LIMITED                                           --                                                                                           --  COMPARISON:   None.                                           --                                                                                           --  INDICATIONS:   Swelling                                           --                                                                                           --  TECHNIQUE:   Color duplex Doppler ultrasound evaluation and analysis of       the deep venous system of                                          --                                      the                                                  --  upper extremity was performed in the usual manner.                                       --                                                                                           --  Right Impression:                                            --  Normal internal jugular and subclavian vein flow and compressibility                     --                                                                                           --  Left Impression:                                            --  There is absent  flow signals in the subclavian, axillary, and brachial       veins.  Imaging shows                                            --  intraluminal thrombus in the subclavian, axillary, and brachial veins       with non compressibility.                                             --  Flow signals are dampened at the radial ulnar position.  The radial       ulnar and cephalic vein show                                            --  normal compressibility.  The basilic vein shows thrombosis with       intraluminal thrombus and non                                            --  compressibility.  The internal jugular vein shows normal       compressibility.                                           --                                                                                           --  CONCLUSION:                                              --  1. There is an acute deep venous thrombosis in the left upper extremity       involving the subclavian,                                            --  axillary, and brachial veins.  There is superficial thrombophlebitis in       the basilic vein.                                           --                                                                                           --                                                                                           --                                                                                           --   Rolando Mccain MD                                                  --  Electronically Signed and Approved By: Rolando Mccain MD on 8/16/2019 at       20:46            PAST, FAMILY   Past Medical History      Past Medical History:  Bleeding Condition (Protein C deficiency), Seizures (AS A    CHILD)            Past Surgical History      Biopsy, Cholecystectomy            Upper and lower endoscopy            Family History      Family History:  Kidney Cancer (FATHER)            Cousin with protein C deficiency             Social History      Marital Status:        Lives independently:  Yes      Number of Children:  2      Occupation:  [employer]            Tobacco Use      Tobacco status:  Never smoker            Alcohol Use      Alcohol intake:  None            Substance Use      Substance use:  Denies use            REVIEW OF SYSTEMS      General:  Admits: Fatigue;          Denies: Weight Loss      Eye:  Denies Blurred Vision, Denies Corrective Lenses      ENT:  Denies Headache, Denies Hearing Loss; Admits Sore Throat      Cardiovascular:  Denies Chest Pain, Denies Palpitations      Respiratory:  Denies: Productive Cough, Shortness of Air      Gastrointestinal:  Admits Constipation; Denies Nausea/Vomiting, Denies Problem     Swallowing      Genitourinary:  Denies Blood in Urine, Denies Incontinence      Musculoskeletal:  Denies Back Pain; Admits Muscle Pain      Neurologic:  Denies Dizziness, Denies Numbness\Tingling      Hematologic/Lymphatic:  Denies Bruising, Denies Bleeding      Reproductive:  Admits: Heavy Periods, Still Menstruating            VITAL SIGNS AND SCORES      Vitals      Height 5 ft 5.75 in / 167 cm      Weight 151 lbs 0.242 oz / 68.5 kg      BSA 1.77 m2      BMI 24.6 kg/m2      Temperature 97.7 F / 36.5 C - Temporal      Pulse 68      Respirations 14      Blood Pressure 107/72 Sitting, Right Arm      Pulse Oximetry 100%, RM AIR            Pain Score      Pain Scale Used:  Numerical      Pain Intensity:  2            Fatigue Score      Fatigue (0-10 scale):  5            EXAM      General Appearance:  Positive for: Alert, Oriented x3, Cooperative;          Negative for: Acute Distress      Eye:  Positive for: Anicteric Sclerae, Moist Conjunctiva      Neck:  Positive for: Supple;          Negative for: JVD, Masses      Respiratory:  Positive for: CTAB, Normal Respiratory Effort      Abdomen/Gastro:  Positive for: Normal Active Bowel Sounds, Soft;          Negative for: Distention,  Hepatosplenomegaly, Tenderness      Cardiovascular:  Positive for: RRR;          Negative for: Gallop, Murmur, Peripheral Edema, Rub      Psychiatric:  Positive for: Appropriate Affect, Intact Judgement      Upper Extremities:  Negative for: Clubbing, Digital Cyanosis, Digital Ischemia      Other      2+ radial and ulnar pulses bilateral      Lymphatic:  Negative for: Axillary, Cervical, Infraclavicular, Supraclavicular            PREVENTION      Hx Influenza Vaccination:  No      2 or More Falls Past Year?:  No      Fall Past Year with Injury?:  No      Hx Pneumococcal Vaccination:  No      Encouraged to follow-up with:  PCP regarding preventative exams.      Chart initiated by      SAHIL JAVIER MA            ALLERGY/MEDS      Allergies      Coded Allergies:             *No Known Allergies (Verified  Allergy, Unknown, 10/22/19)            Medications            Apixaban (Eliquis) 5 Mg Tablet      5 MG PO BID for 30 Days, #60 TAB         Reported         10/22/19       Folic Acid (Folic Acid*) 0.4 Mg Tablet      1600 MCG PO BID, TAB         Reported         8/9/19       (Vitamin B12)   No Conflict Check               Reported         8/9/19       Omeprazole/Sodium Bicarbonate (Zegerid 40 Mg Capsule) 1 Cap Cap      1 CAP PO DAILY, CAP         Reported         3/5/12       Aspirin EC (Aspirin EC) 81 Mg Tabec      1 TAB PO QDAY, TAB 0 Refills         Reported         7/22/09      Medications Reviewed:  Changes made to meds            IMPRESSION/PLAN      Diagnosis      Deep venous thrombosis of left upper extremity         Acute deep vein thrombosis (DVT) of left upper extremity, unspecified vein -        I82.622         Affected thrombotic vein of extremity: unspecified vein of extremity         Chronicity: acute            Notes      Patient with known protein C deficiency.  She had a clot in the left arm which     appears to be a provoked event after anesthesia for endoscopies.  Given her     underlying  hypercoagulable state, I would recommend at least 6 months of     anticoagulation.  She has no other risk factors.  Given the provoked nature of     this clot, it would be reasonable to attempt to have let her come off of     anticoagulation although we discussed that any further clotting events would p    rompt long-term anticoagulation.  Eliquis is appropriate.  I will see her in 3     months to reassess.      New Medications      * Apixaban (Eliquis) 5 MG TABLET: 5 MG PO BID 30 Days #60            Patient Education      Patient Education Provided:  Yes            Electronically signed by PILO BALIEY  10/22/2019 16:16       Disclaimer: Converted document may not contain table formatting or lab diagrams. Please see [employer] for the authenticated document.

## 2021-06-02 ENCOUNTER — HOSPITAL ENCOUNTER (OUTPATIENT)
Dept: GENERAL RADIOLOGY | Facility: HOSPITAL | Age: 46
Discharge: HOME | End: 2021-06-02
Attending: NURSE PRACTITIONER

## 2021-06-08 RX ORDER — HYOSCYAMINE SULFATE EXTENDED-RELEASE 0.38 MG/1
375 TABLET ORAL EVERY 12 HOURS
COMMUNITY
Start: 2021-04-05 | End: 2021-06-09

## 2021-06-08 RX ORDER — LUBIPROSTONE 8 UG/1
8 CAPSULE, GELATIN COATED ORAL 2 TIMES DAILY
COMMUNITY
Start: 2021-05-18 | End: 2021-09-13 | Stop reason: SDUPTHER

## 2021-06-09 ENCOUNTER — OFFICE VISIT (OUTPATIENT)
Dept: GASTROENTEROLOGY | Facility: CLINIC | Age: 46
End: 2021-06-09

## 2021-06-09 VITALS
WEIGHT: 165 LBS | HEIGHT: 66 IN | SYSTOLIC BLOOD PRESSURE: 109 MMHG | DIASTOLIC BLOOD PRESSURE: 70 MMHG | TEMPERATURE: 97.2 F | BODY MASS INDEX: 26.52 KG/M2 | HEART RATE: 68 BPM

## 2021-06-09 DIAGNOSIS — R12 HEARTBURN: ICD-10-CM

## 2021-06-09 DIAGNOSIS — K58.1 IRRITABLE BOWEL SYNDROME WITH CONSTIPATION: Primary | ICD-10-CM

## 2021-06-09 DIAGNOSIS — R13.12 OROPHARYNGEAL DYSPHAGIA: ICD-10-CM

## 2021-06-09 PROCEDURE — 99212 OFFICE O/P EST SF 10 MIN: CPT | Performed by: INTERNAL MEDICINE

## 2021-06-09 RX ORDER — ASPIRIN 81 MG/1
TABLET ORAL EVERY 24 HOURS
COMMUNITY

## 2021-06-09 RX ORDER — HYOSCYAMINE SULFATE EXTENDED-RELEASE 0.38 MG/1
TABLET ORAL EVERY 12 HOURS SCHEDULED
COMMUNITY
End: 2021-09-13

## 2021-06-09 RX ORDER — LANSOPRAZOLE 15 MG/1
15 CAPSULE, DELAYED RELEASE ORAL DAILY
COMMUNITY

## 2021-06-09 NOTE — PROGRESS NOTES
"Chief Complaint  Last Office Visit (3.24.21), Cancelled EGD (Pt cx'd EGD for 4.28.21), Esophagram (6.2.21 - Small Hiatal Hernia), Gastric Emptying Study (5.12.21), EGD/Colonoscopy (8.19.19), and NO RECALLS    Subjective          Norma Cabrales presents to [facility]  History of Present Illness     Patient continues to have trouble swallowing. She reports that when she is chewing the food, she has to swallow the food a couple of times to get it down in to her esophagus. She denies choking or coughing. She does reports her eyes getting tired at the end of the day. There is no other weakness or numbness. She also complains of having epigastric pain and heartburn with certain foods. The pain is mild to moderate and non-radiating and lasts about 30 minutes. She has symptoms of crampy, lower abdominal pain with constipation. No bleeding or diarrhea.       Objective   Vital Signs:   /70 (BP Location: Right arm, Patient Position: Sitting, Cuff Size: Adult)   Pulse 68   Temp 97.2 °F (36.2 °C)   Ht 167.6 cm (66\")   Wt 74.8 kg (165 lb)   BMI 26.63 kg/m²     Physical Exam   Result Review :     CMP    CMP 9/2/20   Glucose 83   BUN 11   Creatinine 0.80   Sodium 140   Potassium 4.1   Chloride 104   Calcium 9.5   Albumin 4.0   Total Bilirubin 0.29   Alkaline Phosphatase 93   AST (SGOT) 20   ALT (SGPT) 21           CBC    CBC 9/2/20   WBC 7.85   RBC 4.93   Hemoglobin 13.8   Hematocrit 44.3   MCV 89.9   MCH 28.0   MCHC 31.2 (A)   RDW 13.1   Platelets 347   (A) Abnormal value                       Assessment and Plan    Diagnoses and all orders for this visit:    1. Irritable bowel syndrome with constipation (Primary)    2. Heartburn    3. Oropharyngeal dysphagia        Follow Up   Return in about 4 months (around 10/9/2021).  Patient was given instructions and counseling regarding her condition or for health maintenance advice. Please see specific information pulled into the AVS if " appropriate.   Refer patient to Dr. Simon to r/o myesthenia gravis.

## 2021-06-14 ENCOUNTER — TRANSCRIBE ORDERS (OUTPATIENT)
Dept: ADMINISTRATIVE | Facility: HOSPITAL | Age: 46
End: 2021-06-14

## 2021-06-14 DIAGNOSIS — Z12.31 SCREENING MAMMOGRAM, ENCOUNTER FOR: Primary | ICD-10-CM

## 2021-07-20 ENCOUNTER — HOSPITAL ENCOUNTER (OUTPATIENT)
Dept: MAMMOGRAPHY | Facility: HOSPITAL | Age: 46
Discharge: HOME | End: 2021-07-20
Admitting: OBSTETRICS & GYNECOLOGY

## 2021-07-20 DIAGNOSIS — Z12.31 SCREENING MAMMOGRAM, ENCOUNTER FOR: ICD-10-CM

## 2021-07-20 PROCEDURE — 77067 SCR MAMMO BI INCL CAD: CPT

## 2021-07-20 PROCEDURE — 77063 BREAST TOMOSYNTHESIS BI: CPT

## 2021-07-23 PROBLEM — E03.9 HYPOTHYROIDISM: Status: ACTIVE | Noted: 2021-07-23

## 2021-07-23 PROBLEM — E55.9 VITAMIN D DEFICIENCY: Status: ACTIVE | Noted: 2021-07-23

## 2021-08-04 PROBLEM — D68.59 PROTEIN C DEFICIENCY (HCC): Status: ACTIVE | Noted: 2021-08-04

## 2021-08-04 PROBLEM — Z90.49 HX OF CHOLECYSTECTOMY: Status: ACTIVE | Noted: 2021-08-04

## 2021-08-04 PROBLEM — K58.9 IRRITABLE BOWEL SYNDROME: Status: ACTIVE | Noted: 2021-06-09

## 2021-08-04 PROBLEM — E16.2 HYPOGLYCEMIA: Status: ACTIVE | Noted: 2021-08-04

## 2021-08-04 PROBLEM — I82.A22: Status: ACTIVE | Noted: 2021-08-04

## 2021-08-05 NOTE — PROGRESS NOTES
"Chief Complaint  Annual Exam , FLUID IN EARS, GET MOTION SICKNESS, WITH MOVEMENTS , VISUAL ISSUES  TAKING PSEUDOFED  TRIED SWIMMERS EARS ---  EARS HURT AND FEELS FULL       Subjective          Norma Cabrales presents to [facility]    PREVENTITIVE MEASURES, ETOH AND SEAT BELTS AND EXERCISE DISUCSSED AUG 9, 2021,    Objective   Vital Signs  Vitals:    08/09/21 1555   BP: 116/79   BP Location: Right arm   Patient Position: Sitting   Pulse: 75   Temp: 98 °F (36.7 °C)   SpO2: 98%   Weight: 69.2 kg (152 lb 9.6 oz)   Height: 167.6 cm (66\")      Review of Systems   Constitutional: Negative.    HENT: Negative.    Eyes: Negative.    Respiratory: Negative.    Cardiovascular: Negative.    Gastrointestinal: Positive for abdominal distention, abdominal pain and GERD.   Endocrine: Negative.    Genitourinary: Negative.    Musculoskeletal: Negative.    Allergic/Immunologic: Negative.    Neurological: Negative.    Hematological: Negative.    Psychiatric/Behavioral: Negative.       Physical Exam  Constitutional:       General: She is not in acute distress.     Appearance: Normal appearance.   HENT:      Head: Normocephalic.      Mouth/Throat:      Mouth: Mucous membranes are moist.   Eyes:      Conjunctiva/sclera: Conjunctivae normal.      Pupils: Pupils are equal, round, and reactive to light.   Cardiovascular:      Rate and Rhythm: Normal rate and regular rhythm.      Pulses: Normal pulses.      Heart sounds: Normal heart sounds.   Pulmonary:      Effort: Pulmonary effort is normal.      Breath sounds: Normal breath sounds.   Abdominal:      General: Abdomen is flat. Bowel sounds are normal.      Palpations: Abdomen is soft.   Musculoskeletal:         General: No swelling. Normal range of motion.      Cervical back: Neck supple.   Skin:     General: Skin is warm and dry.      Coloration: Skin is not jaundiced.   Neurological:      General: No focal deficit present.      Mental Status: She is " alert and oriented to person, place, and time. Mental status is at baseline.   Psychiatric:         Mood and Affect: Mood normal.         Behavior: Behavior normal.         Thought Content: Thought content normal.         Judgment: Judgment normal.        Result Review :   No results found for: PROBNP, BNP  CMP    CMP 9/2/20   Glucose 83   BUN 11   Creatinine 0.80   Sodium 140   Potassium 4.1   Chloride 104   Calcium 9.5   Albumin 4.0   Total Bilirubin 0.29   Alkaline Phosphatase 93   AST (SGOT) 20   ALT (SGPT) 21           CBC w/diff    CBC w/Diff 9/2/20   WBC 7.85   RBC 4.93   Hemoglobin 13.8   Hematocrit 44.3   MCV 89.9   MCH 28.0   MCHC 31.2 (A)   RDW 13.1   Platelets 347   Neutrophil Rel % 64.2   Lymphocyte Rel % 25.2   Monocyte Rel % 6.2   Eosinophil Rel % 3.1   Basophil Rel % 1.0   (A) Abnormal value             Lipid Panel    Lipid Panel 9/2/20   Total Cholesterol 182   Triglycerides 164 (A)   HDL Cholesterol 47   VLDL Cholesterol 33   LDL Cholesterol  102 (A)   (A) Abnormal value       Comments are available for some flowsheets but are not being displayed.            Lab Results   Component Value Date    TSH 3.110 09/02/2020    TSH 4.670 (H) 12/10/2019      No results found for: FREET4                       Assessment and Plan    ALLERGY , SEROUS OTITS , ALLERGIC RHINITIS, WILL RX WITH DECADRON 4 MG TID #15  WILL TRY DYMISTA, AND CONT OTC BENADRYL , AUG 2021, ---    ABD PAIN, DYSPEPSIA,  R/O ALPHA GAL--- currently doing acupuncture, with marked improvement, also needs refill of Levbid, August 2021--- patients can continue acupuncture is following up with GI service Shwetha Gifford at Dr. León's office, considering referral to [facility] or [facility] if symptoms do not improve-----consider upper GI and small bowel follow-through also, August 2021    Now with pain and swelling of the left arm and tenderness, questionable adenopathy to the forearm deltoid axillary and left shoulder area, September  14, 2020 will check venous evaluation of the left upper extremity, cannot exclude cervical radiculopathy and/or infectious inflammatory etiologies, consider doxycycline as empiric therapy while we are waiting for the venous evaluation,    Acute DVT left upper extremity etiology unclear most likely due to trauma from recent colonoscopy and procedure,, AUG 2019, -------, Chest x-ray showed no active disease, echocardiogram August 2019 showed trace mitral, tricuspid regurgitation normal ejection fraction limited study otherwise,----------------Currently off Eliquis as of end of February 2020 with tapering dose, swelling improved still with some pain discomfort if overuse and cold to the arm,    Patient did follow up with oncology hematology for second opinion with history of protein C deficiency and blood clot and length of anticoagulation,  Eliquis 5 mg twice a day, since August 19, 2019,----OFF NOW    IBS---Abdominal pains, associated shortness of breath lasting 45 minutes Friday night, associated after a meal restaurant, symptoms resolved and not recurred, discussion with treatment workup etc. previous CT scan September 2018 did not show any significant findings, S/P EGD , SCOPE, AUG 2019, PIPPA , --DID SEE AGAIN BY TELEHEALTH, STARTED ON HYOSCYAMINE / METAMUCIL, COLACE---MARCH 2020, ---PRIOR W/U WITH EGD SCOPE 2011 WITH GI IN [place]     Patient with EKG today April 15, 2019 showing irregular early repolarization changes throughout, will get second opinion from cardiology Dr. Rondon, Did have workup,    QUEENIE 2008, DR JOHNSON --    HYPOGLYCEMIA SPELLS, --RESOLVED     SZ AS TEENAGER, RESOLVED    PROT. C DEF, -----ON ASA    H/O UTI, 2017,     FHX OF RENAL CELL CA      Follow Up   No follow-ups on file.  Patient was given instructions and counseling regarding her condition or for health maintenance advice. Please see specific information pulled into the AVS if appropriate.

## 2021-08-09 ENCOUNTER — OFFICE VISIT (OUTPATIENT)
Dept: INTERNAL MEDICINE | Facility: CLINIC | Age: 46
End: 2021-08-09

## 2021-08-09 VITALS
WEIGHT: 152.6 LBS | TEMPERATURE: 98 F | OXYGEN SATURATION: 98 % | SYSTOLIC BLOOD PRESSURE: 116 MMHG | BODY MASS INDEX: 24.53 KG/M2 | HEIGHT: 66 IN | HEART RATE: 75 BPM | DIASTOLIC BLOOD PRESSURE: 79 MMHG

## 2021-08-09 DIAGNOSIS — Z90.49 HX OF CHOLECYSTECTOMY: ICD-10-CM

## 2021-08-09 DIAGNOSIS — K58.9 IRRITABLE BOWEL SYNDROME, UNSPECIFIED TYPE: ICD-10-CM

## 2021-08-09 DIAGNOSIS — E16.2 HYPOGLYCEMIA: ICD-10-CM

## 2021-08-09 DIAGNOSIS — D68.59 PROTEIN C DEFICIENCY (HCC): ICD-10-CM

## 2021-08-09 DIAGNOSIS — I82.A22 DVT OF AXILLARY VEIN, CHRONIC LEFT (HCC): Primary | ICD-10-CM

## 2021-08-09 PROCEDURE — 99396 PREV VISIT EST AGE 40-64: CPT | Performed by: INTERNAL MEDICINE

## 2021-08-09 RX ORDER — MOMETASONE FUROATE 50 UG/1
2 SPRAY, METERED NASAL DAILY
Qty: 1 EACH | Refills: 12 | Status: SHIPPED | OUTPATIENT
Start: 2021-08-09

## 2021-08-09 RX ORDER — AZELASTINE 1 MG/ML
2 SPRAY, METERED NASAL 2 TIMES DAILY
Qty: 1 EACH | Refills: 12 | Status: SHIPPED | OUTPATIENT
Start: 2021-08-09 | End: 2022-08-15

## 2021-08-09 RX ORDER — DEXAMETHASONE 4 MG/1
4 TABLET ORAL 2 TIMES DAILY WITH MEALS
Qty: 18 TABLET | Refills: 1 | Status: SHIPPED | OUTPATIENT
Start: 2021-08-09 | End: 2022-04-05

## 2021-08-13 ENCOUNTER — LAB (OUTPATIENT)
Dept: LAB | Facility: HOSPITAL | Age: 46
End: 2021-08-13

## 2021-08-13 DIAGNOSIS — E16.2 HYPOGLYCEMIA: ICD-10-CM

## 2021-08-13 DIAGNOSIS — D68.59 PROTEIN C DEFICIENCY (HCC): ICD-10-CM

## 2021-08-13 DIAGNOSIS — K58.9 IRRITABLE BOWEL SYNDROME, UNSPECIFIED TYPE: ICD-10-CM

## 2021-08-13 DIAGNOSIS — I82.A22 DVT OF AXILLARY VEIN, CHRONIC LEFT (HCC): ICD-10-CM

## 2021-08-13 DIAGNOSIS — Z90.49 HX OF CHOLECYSTECTOMY: ICD-10-CM

## 2021-08-13 LAB
ALBUMIN SERPL-MCNC: 4.3 G/DL (ref 3.5–5.2)
ALBUMIN/GLOB SERPL: 1.7 G/DL
ALP SERPL-CCNC: 80 U/L (ref 39–117)
ALT SERPL W P-5'-P-CCNC: 23 U/L (ref 1–33)
ANION GAP SERPL CALCULATED.3IONS-SCNC: 12.2 MMOL/L (ref 5–15)
AST SERPL-CCNC: 14 U/L (ref 1–32)
BASOPHILS # BLD AUTO: 0.01 10*3/MM3 (ref 0–0.2)
BASOPHILS NFR BLD AUTO: 0.1 % (ref 0–1.5)
BILIRUB SERPL-MCNC: 0.4 MG/DL (ref 0–1.2)
BUN SERPL-MCNC: 9 MG/DL (ref 6–20)
BUN/CREAT SERPL: 12.2 (ref 7–25)
CALCIUM SPEC-SCNC: 9.4 MG/DL (ref 8.6–10.5)
CHLORIDE SERPL-SCNC: 104 MMOL/L (ref 98–107)
CHOLEST SERPL-MCNC: 233 MG/DL (ref 0–200)
CO2 SERPL-SCNC: 24.8 MMOL/L (ref 22–29)
CREAT SERPL-MCNC: 0.74 MG/DL (ref 0.57–1)
CRP SERPL-MCNC: <0.3 MG/DL (ref 0–0.5)
DEPRECATED RDW RBC AUTO: 39.1 FL (ref 37–54)
EOSINOPHIL # BLD AUTO: 0 10*3/MM3 (ref 0–0.4)
EOSINOPHIL NFR BLD AUTO: 0 % (ref 0.3–6.2)
ERYTHROCYTE [DISTWIDTH] IN BLOOD BY AUTOMATED COUNT: 12.2 % (ref 12.3–15.4)
ERYTHROCYTE [SEDIMENTATION RATE] IN BLOOD: 3 MM/HR (ref 0–20)
FOLATE SERPL-MCNC: >20 NG/ML (ref 4.78–24.2)
GFR SERPL CREATININE-BSD FRML MDRD: 86 ML/MIN/1.73
GLOBULIN UR ELPH-MCNC: 2.5 GM/DL
GLUCOSE SERPL-MCNC: 83 MG/DL (ref 65–99)
HCT VFR BLD AUTO: 43.3 % (ref 34–46.6)
HDLC SERPL-MCNC: 59 MG/DL (ref 40–60)
HGB BLD-MCNC: 14.1 G/DL (ref 12–15.9)
IMM GRANULOCYTES # BLD AUTO: 0.03 10*3/MM3 (ref 0–0.05)
IMM GRANULOCYTES NFR BLD AUTO: 0.3 % (ref 0–0.5)
IRON 24H UR-MRATE: 59 MCG/DL (ref 37–145)
IRON SATN MFR SERPL: 15 % (ref 20–50)
LDLC SERPL CALC-MCNC: 152 MG/DL (ref 0–100)
LDLC/HDLC SERPL: 2.53 {RATIO}
LYMPHOCYTES # BLD AUTO: 1.14 10*3/MM3 (ref 0.7–3.1)
LYMPHOCYTES NFR BLD AUTO: 11.5 % (ref 19.6–45.3)
MCH RBC QN AUTO: 28.8 PG (ref 26.6–33)
MCHC RBC AUTO-ENTMCNC: 32.6 G/DL (ref 31.5–35.7)
MCV RBC AUTO: 88.5 FL (ref 79–97)
MONOCYTES # BLD AUTO: 0.39 10*3/MM3 (ref 0.1–0.9)
MONOCYTES NFR BLD AUTO: 3.9 % (ref 5–12)
NEUTROPHILS NFR BLD AUTO: 8.38 10*3/MM3 (ref 1.7–7)
NEUTROPHILS NFR BLD AUTO: 84.2 % (ref 42.7–76)
NRBC BLD AUTO-RTO: 0 /100 WBC (ref 0–0.2)
PLATELET # BLD AUTO: 374 10*3/MM3 (ref 140–450)
PMV BLD AUTO: 10.7 FL (ref 6–12)
POTASSIUM SERPL-SCNC: 4 MMOL/L (ref 3.5–5.2)
PROT SERPL-MCNC: 6.8 G/DL (ref 6–8.5)
RBC # BLD AUTO: 4.89 10*6/MM3 (ref 3.77–5.28)
SODIUM SERPL-SCNC: 141 MMOL/L (ref 136–145)
T4 FREE SERPL-MCNC: 1.13 NG/DL (ref 0.93–1.7)
TIBC SERPL-MCNC: 389 MCG/DL (ref 298–536)
TRANSFERRIN SERPL-MCNC: 261 MG/DL (ref 200–360)
TRIGL SERPL-MCNC: 123 MG/DL (ref 0–150)
TSH SERPL DL<=0.05 MIU/L-ACNC: 1.13 UIU/ML (ref 0.27–4.2)
VIT B12 BLD-MCNC: >2000 PG/ML (ref 211–946)
VLDLC SERPL-MCNC: 22 MG/DL (ref 5–40)
WBC # BLD AUTO: 9.95 10*3/MM3 (ref 3.4–10.8)

## 2021-08-13 PROCEDURE — 86003 ALLG SPEC IGE CRUDE XTRC EA: CPT

## 2021-08-13 PROCEDURE — 84439 ASSAY OF FREE THYROXINE: CPT

## 2021-08-13 PROCEDURE — 80053 COMPREHEN METABOLIC PANEL: CPT

## 2021-08-13 PROCEDURE — 86008 ALLG SPEC IGE RECOMB EA: CPT

## 2021-08-13 PROCEDURE — 84443 ASSAY THYROID STIM HORMONE: CPT

## 2021-08-13 PROCEDURE — 86140 C-REACTIVE PROTEIN: CPT

## 2021-08-13 PROCEDURE — 80061 LIPID PANEL: CPT

## 2021-08-13 PROCEDURE — 85652 RBC SED RATE AUTOMATED: CPT

## 2021-08-13 PROCEDURE — 85025 COMPLETE CBC W/AUTO DIFF WBC: CPT

## 2021-08-13 PROCEDURE — 84466 ASSAY OF TRANSFERRIN: CPT

## 2021-08-13 PROCEDURE — 83540 ASSAY OF IRON: CPT

## 2021-08-13 PROCEDURE — 82607 VITAMIN B-12: CPT

## 2021-08-13 PROCEDURE — 82746 ASSAY OF FOLIC ACID SERUM: CPT

## 2021-08-13 PROCEDURE — 36415 COLL VENOUS BLD VENIPUNCTURE: CPT

## 2021-08-19 LAB
ALPHA-GAL IGE QN: <0.1 KU/L
BEEF IGE QN: <0.1 KU/L
DEPRECATED BEEF IGE RAST QL: 0
DEPRECATED LAMB IGE RAST QL: 0
DEPRECATED PORK IGE RAST QL: 0
LAMB IGE QN: <0.1 KU/L
PORK IGE QN: <0.1 KU/L

## 2021-08-26 ENCOUNTER — OFFICE VISIT (OUTPATIENT)
Dept: INTERNAL MEDICINE | Facility: CLINIC | Age: 46
End: 2021-08-26

## 2021-08-26 VITALS
SYSTOLIC BLOOD PRESSURE: 125 MMHG | BODY MASS INDEX: 25.04 KG/M2 | TEMPERATURE: 97.3 F | HEART RATE: 74 BPM | OXYGEN SATURATION: 99 % | RESPIRATION RATE: 18 BRPM | DIASTOLIC BLOOD PRESSURE: 87 MMHG | WEIGHT: 155.8 LBS | HEIGHT: 66 IN

## 2021-08-26 DIAGNOSIS — J30.1 ALLERGIC RHINITIS DUE TO POLLEN, UNSPECIFIED SEASONALITY: ICD-10-CM

## 2021-08-26 DIAGNOSIS — J06.9 ACUTE URI: Primary | ICD-10-CM

## 2021-08-26 PROCEDURE — 99213 OFFICE O/P EST LOW 20 MIN: CPT | Performed by: INTERNAL MEDICINE

## 2021-08-26 NOTE — PROGRESS NOTES
"Chief Complaint  Sinus Problem (Seen 2 weeks ago. ) and Ear Drainage , FLUID IN EARS, GET MOTION SICKNESS, WITH MOVEMENTS , VISUAL ISSUES  TAKING PSEUDOFED  TRIED SWIMMERS EARS ---  EARS HURT AND FEELS FULL   Here to follow-up with abnormal and recent lab work done, on previous visit    Subjective    Still with facial pain, from ears to sinus pressure        Norma Cabrales presents to [facility]    PREVENTITIVE MEASURES, ETOH AND SEAT BELTS AND EXERCISE DISUCSSED AUG 9, 2021,    Lungs clear b/l  cv rrr  Nose with mild edema and mucus  Throat , nl   No facial rash  Neck nl  Ears with fluid b/l yellow       Objective   Vital Signs  Vitals:    08/26/21 1148   BP: 125/87   Pulse: 74   Resp: 18   Temp: 97.3 °F (36.3 °C)   SpO2: 99%   Weight: 70.7 kg (155 lb 12.8 oz)   Height: 167.6 cm (66\")            Result Review :   No results found for: PROBNP, BNP  CMP    CMP 9/2/20 8/13/21   Glucose  83   Glucose 83    BUN 11 9   Creatinine 0.80 0.74   eGFR Non African Am  86   Sodium 140 141   Potassium 4.1 4.0   Chloride 104 104   Calcium 9.5 9.4   Albumin 4.0 4.30   Total Bilirubin 0.29 0.4   Alkaline Phosphatase 93 80   AST (SGOT) 20 14   ALT (SGPT) 21 23           CBC w/diff    CBC w/Diff 9/2/20   WBC 7.85   RBC 4.93   Hemoglobin 13.8   Hematocrit 44.3   MCV 89.9   MCH 28.0   MCHC 31.2 (A)   RDW 13.1   Platelets 347   Neutrophil Rel % 64.2   Lymphocyte Rel % 25.2   Monocyte Rel % 6.2   Eosinophil Rel % 3.1   Basophil Rel % 1.0   (A) Abnormal value             Lipid Panel    Lipid Panel 9/2/20 8/13/21   Total Cholesterol  233 (A)   Total Cholesterol 182    Triglycerides 164 (A) 123   HDL Cholesterol 47 59   VLDL Cholesterol 33 22   LDL Cholesterol  102 (A) 152 (A)   LDL/HDL Ratio  2.53   (A) Abnormal value       Comments are available for some flowsheets but are not being displayed.            Lab Results   Component Value Date    TSH 1.130 08/13/2021    TSH 3.110 09/02/2020    TSH 4.670 " (H) 12/10/2019      Lab Results   Component Value Date    FREET4 1.13 08/13/2021                            Assessment and Plan    ALLERGY , SEROUS OTITS , ALLERGIC RHINITIS, WILL RX WITH DECADRON 4 MG TID #15  WILL TRY DYMISTA, AND CONT OTC BENADRYL , AUG 2021, --- still has pressure in her ears with fluid, burning and pressure in her face and sinus cavities, hurts to go outside into the air, they actually feel dry August 26, 2021---rec stop all above, will try alavert d 12hr  And start saline nasal spray,    ABD PAIN, DYSPEPSIA,  R/O ALPHA GAL--- currently doing acupuncture, with marked improvement, also needs refill of Levbid, August 2021--- patients can continue acupuncture is following up with GI service Shwetha Gifford at Dr. León's office, considering referral to [facility] [place] or [facility] if symptoms do not improve-----consider upper GI and small bowel follow-through also, August 2021, patient is on a gluten type free diet on her own over the past 11 yrs    Previous pain and swelling of the left arm and tenderness, questionable adenopathy to the forearm deltoid axillary and left shoulder area, September 14, 2020 will check venous evaluation of the left upper extremity, cannot exclude cervical radiculopathy and/or infectious inflammatory etiologies, consider doxycycline as empiric therapy while we are waiting for the venous evaluation,    Acute DVT left upper extremity etiology unclear most likely due to trauma from recent colonoscopy and procedure,, AUG 2019, -------, Chest x-ray showed no active disease, echocardiogram August 2019 showed trace mitral, tricuspid regurgitation normal ejection fraction limited study otherwise,----------------Currently off Eliquis as of end of February 2020 with tapering dose, swelling improved still with some pain discomfort if overuse and cold to the arm,    Patient did follow up with oncology hematology for second opinion with history of protein C deficiency and  blood clot and length of anticoagulation,  Eliquis 5 mg twice a day, since August 19, 2019,----OFF NOW    IBS---Abdominal pains, associated shortness of breath lasting 45 minutes Friday night, associated after a meal restaurant, symptoms resolved and not recurred, discussion with treatment workup etc. previous CT scan September 2018 did not show any significant findings, S/P EGD , SCOPE, AUG 2019, PIPPA , --DID SEE AGAIN BY TELEHEALTH, STARTED ON HYOSCYAMINE / METAMUCIL, COLACE---MARCH 2020, ---PRIOR W/U WITH EGD SCOPE 2011 WITH GI IN [place] ----current alpha gal work-up August 2021 was negative, other lab studies including inflammatory markers liver tests all normal iron studies low normal, thyroid levels normal,    Patient with EKG today April 15, 2019 showing irregular early repolarization changes throughout, will get second opinion from cardiology Dr. Rondon, Did have workup,    Elevated cholesterol 233 HDL cholesterol at 59  discussed follow-up again in 6 months to a year,    QUEENIE 2008, DR JOHNSON --    HYPOGLYCEMIA SPELLS, --RESOLVED     SZ AS TEENAGER, RESOLVED    PROT. C DEF, -----ON ASA    H/O UTI, 2017,     FHX OF RENAL CELL CA      Follow Up   No follow-ups on file.  Patient was given instructions and counseling regarding her condition or for health maintenance advice. Please see specific information pulled into the AVS if appropriate.

## 2021-09-13 ENCOUNTER — OFFICE VISIT (OUTPATIENT)
Dept: GASTROENTEROLOGY | Facility: CLINIC | Age: 46
End: 2021-09-13

## 2021-09-13 VITALS
WEIGHT: 156.2 LBS | HEIGHT: 66 IN | TEMPERATURE: 97.7 F | HEART RATE: 75 BPM | BODY MASS INDEX: 25.1 KG/M2 | DIASTOLIC BLOOD PRESSURE: 71 MMHG | SYSTOLIC BLOOD PRESSURE: 115 MMHG

## 2021-09-13 DIAGNOSIS — R13.10 DYSPHAGIA, UNSPECIFIED TYPE: ICD-10-CM

## 2021-09-13 DIAGNOSIS — K58.1 IRRITABLE BOWEL SYNDROME WITH CONSTIPATION: Primary | ICD-10-CM

## 2021-09-13 PROCEDURE — 99212 OFFICE O/P EST SF 10 MIN: CPT | Performed by: NURSE PRACTITIONER

## 2021-09-13 RX ORDER — CETIRIZINE HYDROCHLORIDE 10 MG/1
10 TABLET ORAL DAILY
COMMUNITY

## 2021-09-13 RX ORDER — LUBIPROSTONE 8 UG/1
8 CAPSULE, GELATIN COATED ORAL 2 TIMES DAILY
Qty: 60 CAPSULE | Refills: 3 | Status: SHIPPED | OUTPATIENT
Start: 2021-09-13 | End: 2021-10-13

## 2021-09-13 NOTE — PROGRESS NOTES
Patient Name: Norma Cabrales   Visit Date: 09/13/2021   Patient ID: [id number]  Provider: NARESH Kolb    Sex: female  Location:  Location Address:  Location Phone: [phone]  [place] [address] [phone]    YOB: 1975  Age: 45 y.o.   Primary Care Provider Rajeev Bello MD      Referring Provider: No ref. provider found        Chief Complaint  Abdominal Pain (RLQ pain, less frequent, pt started acupuncture) and Med Refill (amitiza 8mcg, pt states only taking QD not BID)    History of Present Illness  Patient initially presented April 2019 with abdominal pain, reported intolerance to gluten, dairy, yeast; patient had recent labs that were negative, patient also reported history of IBS C and that probiotics and MiraLAX worsen symptoms. Pt had CT 9/2018 that was negative, done for chronic sx of abd pain.   Celiac serology 4/23/2019 negative  PMH sig for Protein C deficiency--was put on Eliquis after developing clot in left arm after scopes.     EGD colonoscopy 8/9/2019: Medium-sized hiatal hernia, otherwise negative EGDduodenal biopsies with mildly increased intraepithelial lymphocytes, nonspecific findings, stomach biopsy with chronic inactive gastritis, esophageal biopsy with chronic inflammation; good prep, negative colonoscopy     3/24/21 office visit - see for detailed symptom hx.     Pt was last seen June after canceling EGD, GES was normal, Esophagram in June showed small hiatal hernia. Dr Correia referred pt to Dr Simon to r/o Myasthenia Gravis but pt did not do.   Pt had c/o LUQ bulging out and pain. Pt states she became very distraught about her GI sx and not getting answers. Pt started acupuncture for her sx later in June and is doing much better.   Taking Amitiza 8mcg/d and this is working well for constipation. Also takes Hyoscyamine before dinner.   Pt states dysphagia sx are resolved so she did not go to neurologist.     Past Medical History:  "  Diagnosis Date   • Abdominal pain    • Blood disease    • Bowel disease    • IBS (irritable bowel syndrome)    • Protein C deficiency (CMS/HCC)    • Seasonal allergies    • Seizure (CMS/HCC)     AS A CHILD       Past Surgical History:   Procedure Laterality Date   • CHOLECYSTECTOMY  2008   • COLONOSCOPY  08/09/2019    DR. DAS   • ENDOSCOPY  08/09/2019    DR. DAS   • GALLBLADDER SURGERY     • UPPER GASTROINTESTINAL ENDOSCOPY  08/09/2019    Dr. Das       Allergies   Allergen Reactions   • Dairy Aid [Lactase] Other (See Comments) and Swelling     abd pain   • Gluten Meal Swelling   • Yeast-Related Products Swelling       Family History   Problem Relation Age of Onset   • Diabetes Mother    • Arthritis Mother    • Cancer Father    • Kidney cancer Father 64   • Breast cancer Maternal Grandmother    • Colon cancer Neg Hx         Social History     Tobacco Use   • Smoking status: Never Smoker   • Smokeless tobacco: Never Used   Vaping Use   • Vaping Use: Never used   Substance Use Topics   • Alcohol use: Never   • Drug use: Never       Objective     Vital Signs:   /71 (BP Location: Right arm, Patient Position: Sitting, Cuff Size: Adult)   Pulse 75   Temp 97.7 °F (36.5 °C) (Temporal)   Ht 167.6 cm (66\")   Wt 70.9 kg (156 lb 3.2 oz)   BMI 25.21 kg/m²       Physical Exam  Constitutional:       General: The patient is not in acute distress.     Appearance: Normal appearance.   HENT:      Head: Normocephalic and atraumatic.      Nose: Nose normal.   Pulmonary:      Effort: Pulmonary effort is normal. No respiratory distress.   Abdominal:      General: Abdomen is flat.      Palpations: Abdomen is soft. There is no mass.      Tenderness: There is no abdominal tenderness. There is no guarding.   Musculoskeletal:      Cervical back: Neck supple.      Right lower leg: No edema.      Left lower leg: No edema.   Skin:     General: Skin is warm and dry.   Neurological:      General: No focal deficit present.      " Mental Status: The patient is alert and oriented to person, place, and time.      Gait: Gait normal.   Psychiatric:         Mood and Affect: Mood normal.         Speech: Speech normal.         Behavior: Behavior normal.         Thought Content: Thought content normal.     Result Review :   The following data was reviewed by: NARESH Kolb on 09/13/2021:  CMP    CMP 8/13/21   Glucose 83   BUN 9   Creatinine 0.74   eGFR Non African Am 86   Sodium 141   Potassium 4.0   Chloride 104   Calcium 9.4   Albumin 4.30   Total Bilirubin 0.4   Alkaline Phosphatase 80   AST (SGOT) 14   ALT (SGPT) 23           CBC    CBC 8/13/21   WBC 9.95   RBC 4.89   Hemoglobin 14.1   Hematocrit 43.3   MCV 88.5   MCH 28.8   MCHC 32.6   RDW 12.2 (A)   Platelets 374   (A) Abnormal value                      Assessment and Plan    Diagnoses and all orders for this visit:    1. Irritable bowel syndrome with constipation (Primary)    2. Dysphagia, unspecified type  Comments:  RESOLVED    Other orders  -     Amitiza 8 MCG capsule; Take 1 capsule by mouth 2 (two) times a day for 30 days.  Dispense: 60 capsule; Refill: 3            Follow Up      -Pt may try to d/c Hyoscyamine  -RF amitiza  -Call or return to clinic PRN if any change in bowel pattern, blood in stool, or new GI sx.   Patient was given instructions and counseling regarding her condition or for health maintenance advice. Please see specific information pulled into the AVS if appropriate.

## 2022-01-21 ENCOUNTER — OFFICE VISIT (OUTPATIENT)
Dept: ORTHOPEDIC SURGERY | Facility: CLINIC | Age: 47
End: 2022-01-21

## 2022-01-21 VITALS — BODY MASS INDEX: 24.11 KG/M2 | HEIGHT: 66 IN | WEIGHT: 150 LBS

## 2022-01-21 DIAGNOSIS — M67.432 GANGLION CYST OF DORSUM OF LEFT WRIST: ICD-10-CM

## 2022-01-21 DIAGNOSIS — M25.532 LEFT WRIST PAIN: Primary | ICD-10-CM

## 2022-01-21 PROCEDURE — 99213 OFFICE O/P EST LOW 20 MIN: CPT | Performed by: ORTHOPAEDIC SURGERY

## 2022-01-21 PROCEDURE — 20612 ASPIRATE/INJ GANGLION CYST: CPT | Performed by: ORTHOPAEDIC SURGERY

## 2022-01-21 NOTE — PROGRESS NOTES
"Chief Complaint  Pain of the Left Wrist     Subjective      Nomra Cabrales presents to [facility] for re-evaluation of the left wrist. The patient was last seen in 2020 for a left wrist ganglion cyst. She reports her cyst is back. We previously aspirated her cyst. She now has 2 ganglion cyst.     Allergies   Allergen Reactions   • Dairy Aid [Lactase] Other (See Comments) and Swelling     abd pain   • Gluten Meal Swelling   • Yeast-Related Products Swelling        Social History     Socioeconomic History   • Marital status:    Tobacco Use   • Smoking status: Never Smoker   • Smokeless tobacco: Never Used   Vaping Use   • Vaping Use: Never used   Substance and Sexual Activity   • Alcohol use: Never   • Drug use: Never   • Sexual activity: Defer        Review of Systems     Objective   Vital Signs:   Ht 168.3 cm (66.25\")   Wt 68 kg (150 lb)   BMI 24.03 kg/m²       Physical Exam  Constitutional:       Appearance: Normal appearance. The patient is well-developed and normal weight.   HENT:      Head: Normocephalic.      Right Ear: Hearing and external ear normal.      Left Ear: Hearing and external ear normal.      Nose: Nose normal.   Eyes:      Conjunctiva/sclera: Conjunctivae normal.   Cardiovascular:      Rate and Rhythm: Normal rate.   Pulmonary:      Effort: Pulmonary effort is normal.      Breath sounds: No wheezing or rales.   Abdominal:      Palpations: Abdomen is soft.      Tenderness: There is no abdominal tenderness.   Musculoskeletal:      Cervical back: Normal range of motion.   Skin:     Findings: No rash.   Neurological:      Mental Status: The patient is alert and oriented to person, place, and time.   Psychiatric:         Mood and Affect: Mood and affect normal.         Judgment: Judgment normal.       Ortho Exam      Left wrist- Ganglion cyst 2 cm by 1 cm, multiraidal cyst. Mild tenderness. Neurovascularly intact. Sensation to light touch median, radial, ulnar " nerve. Positive AIN, PIN, ulnar nerve. Positive pulses. Full finger and wrist ROM.     LEFT WRIST   Consent given by: patient  Site marked: site marked  Timeout: Immediately prior to procedure a time out was called to verify the correct patient, procedure, equipment, support staff and site/side marked as required   Supporting Documentation  Indications: pain   Procedure Details  Location: LEFT WRIST   Preparation: Patient was prepped and draped in the usual sterile fashion  Needle gauge: 18G.  Aspirate amount: 1 mL  Aspirate: clear (GEL LIKE)  Patient tolerance: patient tolerated the procedure well with no immediate complications          X-Ray Report:  Left wrist(s) X-Ray  Indication: Evaluation of left wrist pain  AP and Lateral view(s)  Findings: no acute fracture. Soft tissue swelling to dorsum of the wrist.   Prior studies available for comparison: no         Imaging Results (Most Recent)     Procedure Component Value Units Date/Time    XR Wrist 2 View Left [[id number]] Resulted: 01/21/22 0914     Updated: 01/21/22 0915           Result Review :       No results found.           Assessment and Plan     DX: ganglion cyst of left wrist.     Discussed the treatment options with the patient, operative vs non-operative. Discussed the risks and benefits of an aspiration of the ganglion cyst. The patient expressed understanding and wished to proceed. She is not interested in operative treatment at this time due to a history or blood clots. I aspirated 1 cc of gel like fluid from the left wrist.      Call or return if worsening symptoms.    Follow Up     6 weeks      Patient was given instructions and counseling regarding her condition or for health maintenance advice. Please see specific information pulled into the AVS if appropriate.     Scribed for Casey Jang MD by Cherise Galvez.  01/21/22   09:19 EST  I have personally performed the services described in this document as scribed by the above  individual and it is both accurate and complete. Casey Jang MD 01/22/22

## 2022-04-05 ENCOUNTER — OFFICE VISIT (OUTPATIENT)
Dept: ORTHOPEDIC SURGERY | Facility: CLINIC | Age: 47
End: 2022-04-05

## 2022-04-05 VITALS — HEART RATE: 66 BPM | WEIGHT: 145 LBS | HEIGHT: 66 IN | BODY MASS INDEX: 23.3 KG/M2 | OXYGEN SATURATION: 98 %

## 2022-04-05 DIAGNOSIS — M67.432 GANGLION CYST OF DORSUM OF LEFT WRIST: Primary | ICD-10-CM

## 2022-04-05 PROCEDURE — 20605 DRAIN/INJ JOINT/BURSA W/O US: CPT | Performed by: ORTHOPAEDIC SURGERY

## 2022-04-05 RX ORDER — TRIAMCINOLONE ACETONIDE 40 MG/ML
40 INJECTION, SUSPENSION INTRA-ARTICULAR; INTRAMUSCULAR
Status: COMPLETED | OUTPATIENT
Start: 2022-04-05 | End: 2022-04-05

## 2022-04-05 RX ORDER — LIDOCAINE HYDROCHLORIDE 10 MG/ML
1 INJECTION, SOLUTION INFILTRATION; PERINEURAL
Status: COMPLETED | OUTPATIENT
Start: 2022-04-05 | End: 2022-04-05

## 2022-04-05 RX ADMIN — TRIAMCINOLONE ACETONIDE 40 MG: 40 INJECTION, SUSPENSION INTRA-ARTICULAR; INTRAMUSCULAR at 14:12

## 2022-04-05 RX ADMIN — LIDOCAINE HYDROCHLORIDE 1 ML: 10 INJECTION, SOLUTION INFILTRATION; PERINEURAL at 14:12

## 2022-04-05 NOTE — PROGRESS NOTES
"Chief Complaint  Pain of the Left Wrist     Subjective      Norma Cabrales presents to [facility] for follow up evaluation of the left wrist. She has a ganglion cyst to the left wrist. She has had the cyst aspirated twice. Her cyst is back. She has no new complaints. She is requesting it to be aspirated again.     Allergies   Allergen Reactions   • Dairy Aid [Lactase] Other (See Comments) and Swelling     abd pain   • Gluten Meal Swelling   • Yeast-Related Products Swelling        Social History     Socioeconomic History   • Marital status:    Tobacco Use   • Smoking status: Never Smoker   • Smokeless tobacco: Never Used   Vaping Use   • Vaping Use: Never used   Substance and Sexual Activity   • Alcohol use: Never   • Drug use: Never   • Sexual activity: Defer        Review of Systems     Objective   Vital Signs:   Pulse 66   Ht 167.6 cm (66\")   Wt 65.8 kg (145 lb)   SpO2 98%   BMI 23.40 kg/m²       Physical Exam  Constitutional:       Appearance: Normal appearance. The patient is well-developed and normal weight.   HENT:      Head: Normocephalic.      Right Ear: Hearing and external ear normal.      Left Ear: Hearing and external ear normal.      Nose: Nose normal.   Eyes:      Conjunctiva/sclera: Conjunctivae normal.   Cardiovascular:      Rate and Rhythm: Normal rate.   Pulmonary:      Effort: Pulmonary effort is normal.      Breath sounds: No wheezing or rales.   Abdominal:      Palpations: Abdomen is soft.      Tenderness: There is no abdominal tenderness.   Musculoskeletal:      Cervical back: Normal range of motion.   Skin:     Findings: No rash.   Neurological:      Mental Status: The patient is alert and oriented to person, place, and time.   Psychiatric:         Mood and Affect: Mood and affect normal.         Judgment: Judgment normal.       Ortho Exam      Left wrist- Ganglion cyst 2 cm by 2 cm, multiraidal cyst. Mild tenderness. Neurovascularly intact. " Sensation to light touch median, radial, ulnar nerve. Positive AIN, PIN, ulnar nerve. Positive pulses. Full finger and wrist ROM.     LEFT WRIST GANGLION CYST ASPIRATION AND INJECTION  Consent given by: patient  Site marked: site marked  Timeout: Immediately prior to procedure a time out was called to verify the correct patient, procedure, equipment, support staff and site/side marked as required   Supporting Documentation  Indications: pain   Procedure Details  Location: left wrist.  Preparation: Patient was prepped and draped in the usual sterile fashion  Needle gauge: 23 G.  Medications administered: 1 mL lidocaine 1 %; 40 mg triamcinolone acetonide 40 MG/ML  Aspirate amount: 1 mL  Patient tolerance: patient tolerated the procedure well with no immediate complications            Imaging Results (Most Recent)     None           Result Review :       No results found.           Assessment and Plan     DX: Left wrist ganglion cyst    Discussed the treatment plan with the patient.  Discussed the risks and benefits of an aspiration and injection the left wrist. The patient expressed understanding and wished to proceed. She tolerated the procedure well. I aspirated 1 cc of bloody gel like fluid from the cyst.     Call or return if worsening symptoms.    Follow Up     PRN      Patient was given instructions and counseling regarding her condition or for health maintenance advice. Please see specific information pulled into the AVS if appropriate.     Scribed for Casey Jang MD by Cherise Galvez.  04/05/22   13:42 EDT    I have personally performed the services described in this document as scribed by the above individual and it is both accurate and complete. Casey Jang MD 04/05/22

## 2022-08-09 ENCOUNTER — TELEPHONE (OUTPATIENT)
Dept: INTERNAL MEDICINE | Facility: CLINIC | Age: 47
End: 2022-08-09

## 2022-08-09 DIAGNOSIS — Z00.00 ANNUAL PHYSICAL EXAM: Primary | ICD-10-CM

## 2022-08-09 NOTE — TELEPHONE ENCOUNTER
Caller: Norma Cabrales    Relationship: Self    Best call back number: [phone]    What is the best time to reach you: ANY    Who are you requesting to speak with (clinical staff, provider,  specific staff member): CLINICAL    What was the call regarding: PATIENT STATED THAT SHE IS NEEDING TO KNOW IF SHE NEEDS LABS PRIOR TO HER 8/15/22 PHYSICAL AND TO BE ADVISED    Do you require a callback: YES

## 2022-08-09 NOTE — TELEPHONE ENCOUNTER
Call patient tell her I sent in some basic lab work for her to do prior to her next visit with me,

## 2022-08-15 ENCOUNTER — OFFICE VISIT (OUTPATIENT)
Dept: INTERNAL MEDICINE | Facility: CLINIC | Age: 47
End: 2022-08-15

## 2022-08-15 VITALS
HEART RATE: 65 BPM | OXYGEN SATURATION: 96 % | SYSTOLIC BLOOD PRESSURE: 127 MMHG | HEIGHT: 66 IN | WEIGHT: 162.6 LBS | TEMPERATURE: 97.2 F | DIASTOLIC BLOOD PRESSURE: 81 MMHG | BODY MASS INDEX: 26.13 KG/M2

## 2022-08-15 DIAGNOSIS — Z00.00 ANNUAL PHYSICAL EXAM: ICD-10-CM

## 2022-08-15 DIAGNOSIS — D68.59 PROTEIN C DEFICIENCY: Primary | ICD-10-CM

## 2022-08-15 DIAGNOSIS — O99.119 PROTEIN C DEFICIENCY COMPLICATING PREGNANCY: ICD-10-CM

## 2022-08-15 DIAGNOSIS — I82.90 THROMBOSIS: ICD-10-CM

## 2022-08-15 DIAGNOSIS — E55.9 VITAMIN D DEFICIENCY: ICD-10-CM

## 2022-08-15 DIAGNOSIS — Z90.49 HX OF CHOLECYSTECTOMY: ICD-10-CM

## 2022-08-15 DIAGNOSIS — K58.9 IRRITABLE BOWEL SYNDROME WITHOUT DIARRHEA: ICD-10-CM

## 2022-08-15 DIAGNOSIS — E16.2 HYPOGLYCEMIA: ICD-10-CM

## 2022-08-15 DIAGNOSIS — I82.A22 DVT OF AXILLARY VEIN, CHRONIC LEFT: ICD-10-CM

## 2022-08-15 DIAGNOSIS — D68.59 PROTEIN C DEFICIENCY COMPLICATING PREGNANCY: ICD-10-CM

## 2022-08-15 PROCEDURE — 99396 PREV VISIT EST AGE 40-64: CPT | Performed by: INTERNAL MEDICINE

## 2022-08-15 NOTE — PROGRESS NOTES
"Chief Complaint/ HPI: fatigue  menoupausal sxs--abn peroids ---  Hot flashes   Not sleeping well  Gyn mon---    Here to review labs     On special diet with alpha gal, and conts acupuncture       Objective   Vital Signs  Vitals:    08/15/22 1628   BP: 127/81   BP Location: Left arm   Patient Position: Sitting   Cuff Size: Adult   Pulse: 65   Temp: 97.2 °F (36.2 °C)   TempSrc: Temporal   SpO2: 96%   Weight: 73.8 kg (162 lb 9.6 oz)   Height: 167.6 cm (65.98\")      Body mass index is 26.26 kg/m².  Review of Systems   Constitutional: Negative.    HENT: Negative.    Eyes: Negative.    Respiratory: Negative.    Cardiovascular: Negative.    Gastrointestinal: Negative.    Endocrine: Negative.    Genitourinary: Negative.    Musculoskeletal: Negative.    Allergic/Immunologic: Negative.    Neurological: Negative.    Hematological: Negative.    Psychiatric/Behavioral: Negative.       Physical Exam  Constitutional:       General: She is not in acute distress.     Appearance: Normal appearance.   HENT:      Head: Normocephalic.      Mouth/Throat:      Mouth: Mucous membranes are moist.   Eyes:      Conjunctiva/sclera: Conjunctivae normal.      Pupils: Pupils are equal, round, and reactive to light.   Cardiovascular:      Rate and Rhythm: Normal rate and regular rhythm.      Pulses: Normal pulses.      Heart sounds: Normal heart sounds.   Pulmonary:      Effort: Pulmonary effort is normal.      Breath sounds: Normal breath sounds.   Abdominal:      General: Abdomen is flat. Bowel sounds are normal.      Palpations: Abdomen is soft.   Musculoskeletal:         General: No swelling. Normal range of motion.      Cervical back: Neck supple.   Skin:     General: Skin is warm and dry.      Coloration: Skin is not jaundiced.   Neurological:      General: No focal deficit present.      Mental Status: She is alert and oriented to person, place, and time. Mental status is at baseline.   Psychiatric:         Mood and Affect: Mood normal.    "      Behavior: Behavior normal.         Thought Content: Thought content normal.         Judgment: Judgment normal.        Result Review :   No results found for: PROBNP, BNP          Lab Results   Component Value Date    TSH 1.130 08/13/2021    TSH 3.110 09/02/2020    TSH 4.670 (H) 12/10/2019      Lab Results   Component Value Date    FREET4 1.13 08/13/2021                          Visit Diagnoses:    ICD-10-CM ICD-9-CM   1. Protein C deficiency (HCC)  D68.59 289.81   2. Thrombosis  I82.90 453.9   3. Irritable bowel syndrome without diarrhea  K58.9 564.1   4. Hx of cholecystectomy  Z90.49 V45.79   5. Protein C deficiency complicating pregnancy (HCC)  O99.119 649.30    D68.59 289.81   6. Vitamin D deficiency  E55.9 268.9   7. Hypoglycemia  E16.2 251.2   8. DVT of axillary vein, chronic left (HCC)  I82.A22 453.74   9. Annual physical exam  Z00.00 V70.0       Assessment and Plan   Diagnoses and all orders for this visit:    1. Protein C deficiency (HCC) (Primary)  -     Comprehensive Metabolic Panel; Future  -     CBC & Differential; Future  -     Lipid Panel; Future  -     TSH+Free T4; Future  -     Vitamin D 25 Hydroxy; Future  -     Follicle stimulating hormone; Future  -     Luteinizing hormone; Future  -     Estrogens, Total; Future  -     Vitamin B12 anemia; Future  -     Folate anemia; Future  -     C-reactive Protein; Future  -     Sedimentation Rate; Future  -     Ferritin; Future  -     Iron Profile; Future  -     Hepatitis C Antibody; Future    2. Thrombosis  -     Comprehensive Metabolic Panel; Future  -     CBC & Differential; Future  -     Lipid Panel; Future  -     TSH+Free T4; Future  -     Vitamin D 25 Hydroxy; Future  -     Follicle stimulating hormone; Future  -     Luteinizing hormone; Future  -     Estrogens, Total; Future  -     Vitamin B12 anemia; Future  -     Folate anemia; Future  -     C-reactive Protein; Future  -     Sedimentation Rate; Future  -     Ferritin; Future  -     Iron Profile;  Future  -     Hepatitis C Antibody; Future    3. Irritable bowel syndrome without diarrhea  -     Comprehensive Metabolic Panel; Future  -     CBC & Differential; Future  -     Lipid Panel; Future  -     TSH+Free T4; Future  -     Vitamin D 25 Hydroxy; Future  -     Follicle stimulating hormone; Future  -     Luteinizing hormone; Future  -     Estrogens, Total; Future  -     Vitamin B12 anemia; Future  -     Folate anemia; Future  -     C-reactive Protein; Future  -     Sedimentation Rate; Future  -     Ferritin; Future  -     Iron Profile; Future  -     Hepatitis C Antibody; Future    4. Hx of cholecystectomy  -     Comprehensive Metabolic Panel; Future  -     CBC & Differential; Future  -     Lipid Panel; Future  -     TSH+Free T4; Future  -     Vitamin D 25 Hydroxy; Future  -     Follicle stimulating hormone; Future  -     Luteinizing hormone; Future  -     Estrogens, Total; Future  -     Vitamin B12 anemia; Future  -     Folate anemia; Future  -     C-reactive Protein; Future  -     Sedimentation Rate; Future  -     Ferritin; Future  -     Iron Profile; Future  -     Hepatitis C Antibody; Future    5. Protein C deficiency complicating pregnancy (HCC)  -     Comprehensive Metabolic Panel; Future  -     CBC & Differential; Future  -     Lipid Panel; Future  -     TSH+Free T4; Future  -     Vitamin D 25 Hydroxy; Future  -     Follicle stimulating hormone; Future  -     Luteinizing hormone; Future  -     Estrogens, Total; Future  -     Vitamin B12 anemia; Future  -     Folate anemia; Future  -     C-reactive Protein; Future  -     Sedimentation Rate; Future  -     Ferritin; Future  -     Iron Profile; Future  -     Hepatitis C Antibody; Future    6. Vitamin D deficiency  -     Comprehensive Metabolic Panel; Future  -     CBC & Differential; Future  -     Lipid Panel; Future  -     TSH+Free T4; Future  -     Vitamin D 25 Hydroxy; Future  -     Follicle stimulating hormone; Future  -     Luteinizing hormone; Future  -      Estrogens, Total; Future  -     Vitamin B12 anemia; Future  -     Folate anemia; Future  -     C-reactive Protein; Future  -     Sedimentation Rate; Future  -     Ferritin; Future  -     Iron Profile; Future  -     Hepatitis C Antibody; Future    7. Hypoglycemia  -     Comprehensive Metabolic Panel; Future  -     CBC & Differential; Future  -     Lipid Panel; Future  -     TSH+Free T4; Future  -     Vitamin D 25 Hydroxy; Future  -     Follicle stimulating hormone; Future  -     Luteinizing hormone; Future  -     Estrogens, Total; Future  -     Vitamin B12 anemia; Future  -     Folate anemia; Future  -     C-reactive Protein; Future  -     Sedimentation Rate; Future  -     Ferritin; Future  -     Iron Profile; Future  -     Hepatitis C Antibody; Future    8. DVT of axillary vein, chronic left (HCC)  -     Comprehensive Metabolic Panel; Future  -     CBC & Differential; Future  -     Lipid Panel; Future  -     TSH+Free T4; Future  -     Vitamin D 25 Hydroxy; Future  -     Follicle stimulating hormone; Future  -     Luteinizing hormone; Future  -     Estrogens, Total; Future  -     Vitamin B12 anemia; Future  -     Folate anemia; Future  -     C-reactive Protein; Future  -     Sedimentation Rate; Future  -     Ferritin; Future  -     Iron Profile; Future  -     Hepatitis C Antibody; Future    9. Annual physical exam  -     Comprehensive Metabolic Panel; Future  -     CBC & Differential; Future  -     Lipid Panel; Future  -     TSH+Free T4; Future  -     Vitamin D 25 Hydroxy; Future  -     Follicle stimulating hormone; Future  -     Luteinizing hormone; Future  -     Estrogens, Total; Future  -     Vitamin B12 anemia; Future  -     Folate anemia; Future  -     C-reactive Protein; Future  -     Sedimentation Rate; Future  -     Ferritin; Future  -     Iron Profile; Future  -     Hepatitis C Antibody; Future    Continues on acupuncture August 2022  for digestive issues ---doing well with this     Preventive measures  discussed, patient wears her seatbelt does not smoke or drink she exercises regularly,    ABD PAIN, DYSPEPSIA,  R/O ALPHA GAL--- doing acupuncture, with marked improvement, released from  GI service Shwetha Gifford at Dr. Correia's office,   Did do barium swallow test aug 2021 patient gastric emptying study May 2021 normal,, esophagram showed a small sliding-type hiatal hernia, June 2021     Previous pain and swelling of the left arm and tenderness, questionable adenopathy to the forearm deltoid axillary and left shoulder area, September 14, 2020 -------------will check venous evaluation of the left upper extremity, cannot exclude cervical radiculopathy and/or infectious inflammatory etiologies, consider doxycycline as empiric therapy while we are waiting for the venous evaluation,    Acute DVT left upper extremity etiology ----unclear most likely due to trauma from recent colonoscopy and procedure,, AUG 2019, -------, Chest x-ray showed no active disease, echocardiogram August 2019 showed trace mitral, tricuspid regurgitation normal ejection fraction limited study otherwise,----------------Currently off Eliquis as of end of February 2020 with tapering dose, swelling improved still with some pain discomfort if overuse and cold to the arm,    Patient did follow up with oncology hematology for second opinion with history of protein C deficiency and blood clot and length of anticoagulation,  Eliquis 5 mg twice a day, since August 19, 2019,----OFF NOW    IBS---Abdominal pains, associated shortness of breath lasting 45 minutes Friday night, associated after a meal restaurant, symptoms resolved and not recurred, discussion with treatment workup etc. previous CT scan September 2018 did not show any significant findings, S/P EGD , SCOPE, AUG 2019, PIPPA , --STARTED ON HYOSCYAMINE / METAMUCIL, COLACE---MARCH 2020, ---PRIOR W/U WITH EGD SCOPE 2011 WITH GI IN [place] ----current alpha gal work-up August 2021 was  negative, other lab studies including inflammatory markers liver tests all normal iron studies low normal, thyroid levels normal,    Elevated cholesterol     QUEENIE 2008, DR JOHNSON --    SZ AS TEENAGER, RESOLVED    PROT. C DEF, ----- cont ON ASA    FHX OF RENAL CELL CA    Allergies, --cont zyrtec daily ,, Benadryl nightly, nasocort         Follow Up   Return in about 1 year (around 8/15/2023).  Patient was given instructions and counseling regarding her condition or for health maintenance advice. Please see specific information pulled into the AVS if appropriate.

## 2022-08-16 ENCOUNTER — TELEPHONE (OUTPATIENT)
Dept: INTERNAL MEDICINE | Facility: CLINIC | Age: 47
End: 2022-08-16

## 2022-08-16 NOTE — TELEPHONE ENCOUNTER
I called patient with her labs, told her her cholesterol numbers were high similar to what they were last year consider statin therapy, everything else was in good order, she is going to have to try exercising more or consider statin therapy if next year's labs are still the same,

## 2022-09-12 ENCOUNTER — TRANSCRIBE ORDERS (OUTPATIENT)
Dept: ADMINISTRATIVE | Facility: HOSPITAL | Age: 47
End: 2022-09-12

## 2022-09-12 DIAGNOSIS — Z12.31 ENCOUNTER FOR SCREENING MAMMOGRAM FOR MALIGNANT NEOPLASM OF BREAST: Primary | ICD-10-CM

## 2022-11-23 ENCOUNTER — HOSPITAL ENCOUNTER (OUTPATIENT)
Dept: MAMMOGRAPHY | Facility: HOSPITAL | Age: 47
Discharge: HOME | End: 2022-11-23
Admitting: OBSTETRICS & GYNECOLOGY

## 2022-11-23 DIAGNOSIS — Z12.31 ENCOUNTER FOR SCREENING MAMMOGRAM FOR MALIGNANT NEOPLASM OF BREAST: ICD-10-CM

## 2022-11-23 PROCEDURE — 77063 BREAST TOMOSYNTHESIS BI: CPT

## 2022-11-23 PROCEDURE — 77067 SCR MAMMO BI INCL CAD: CPT

## 2023-11-10 ENCOUNTER — TRANSCRIBE ORDERS (OUTPATIENT)
Dept: ADMINISTRATIVE | Facility: HOSPITAL | Age: 48
End: 2023-11-10
Payer: COMMERCIAL

## 2023-11-10 DIAGNOSIS — Z12.31 SCREENING MAMMOGRAM, ENCOUNTER FOR: Primary | ICD-10-CM

## 2024-01-09 ENCOUNTER — PREP FOR SURGERY (OUTPATIENT)
Dept: OTHER | Facility: HOSPITAL | Age: 49
End: 2024-01-09
Payer: COMMERCIAL

## 2024-01-09 ENCOUNTER — OFFICE VISIT (OUTPATIENT)
Dept: ORTHOPEDIC SURGERY | Facility: CLINIC | Age: 49
End: 2024-01-09
Payer: COMMERCIAL

## 2024-01-09 VITALS
DIASTOLIC BLOOD PRESSURE: 85 MMHG | BODY MASS INDEX: 27.49 KG/M2 | HEIGHT: 65 IN | WEIGHT: 165 LBS | HEART RATE: 77 BPM | SYSTOLIC BLOOD PRESSURE: 129 MMHG | OXYGEN SATURATION: 95 %

## 2024-01-09 DIAGNOSIS — M67.432 GANGLION CYST OF DORSUM OF LEFT WRIST: Primary | ICD-10-CM

## 2024-01-09 PROCEDURE — 99214 OFFICE O/P EST MOD 30 MIN: CPT | Performed by: ORTHOPAEDIC SURGERY

## 2024-01-09 RX ORDER — CEFAZOLIN SODIUM 2 G/100ML
2 INJECTION, SOLUTION INTRAVENOUS ONCE
OUTPATIENT
Start: 2024-01-09 | End: 2024-01-09

## 2024-01-09 NOTE — PROGRESS NOTES
"Chief Complaint  Follow-up of the Left Wrist     Subjective      Norma Cabrales presents to [facility] for follow up evaluation of the left wrist. The patient has previously had a ganglion cyst aspirated in April 2022 with only short term relief. She reports it is back. She denies injury, numbness or tingling.     Allergies   Allergen Reactions    Dairy Aid [Tilactase] Other (See Comments) and Swelling     abd pain    Gluten Meal Swelling    Yeast-Related Products Swelling        Social History     Socioeconomic History    Marital status:    Tobacco Use    Smoking status: Never    Smokeless tobacco: Never   Vaping Use    Vaping Use: Never used   Substance and Sexual Activity    Alcohol use: Never    Drug use: Never    Sexual activity: Never        I reviewed the patient's chief complaint, history of present illness, review of systems, past medical history, surgical history, family history, social history, medications, and allergy list.     Review of Systems     Constitutional: Denies fevers, chills, weight loss  Cardiovascular: Denies chest pain, shortness of breath  Skin: Denies rashes, acute skin changes  Neurologic: Denies headache, loss of consciousness  MSK: Left wrist pain      Vital Signs:   /85   Pulse 77   Ht 165.1 cm (65\")   Wt 74.8 kg (165 lb)   SpO2 95%   BMI 27.46 kg/m²          Physical Exam  General: Alert. No acute distress    Ortho Exam      Left wrist- flexion 85. Extension 80 ganglion cyst to dorsum of the wrist, 2x1cm. Tender. Sensation to light touch median, radial, ulnar nerve. Positive AIN, PIN, ulnar nerve motor function. Positive pulses. Pronation and supination intact.     Procedures      Imaging Results (Most Recent)       None             Result Review :       No results found.           Assessment and Plan     Diagnoses and all orders for this visit:    1. Ganglion cyst of dorsum of left wrist (Primary)        Discussed the treatment " options with the patient, operative vs non-operative. Discussed the risks and benefits of a left wrist ganglion cyst excision. The patient expressed understanding and wished to proceed. Plan for clearance from Dr Ochoa for previous blood clot.    Discussed surgery., Risks/benefits discussed with patient including, but not limited to: infection, bleeding, neurovascular damage, re-rupture, aesthetic deformity, need for further surgery, and death., Discussed with patient the implant type being used during surgery and patient understands., Surgery pamphlet given., and Call or return if worsening symptoms.    Follow Up     2 weeks postoperatively.        Patient was given instructions and counseling regarding her condition or for health maintenance advice. Please see specific information pulled into the AVS if appropriate.     Scribed for Casey Jang MD by Cherise Galvez.  01/09/24   15:26 EST    I have personally performed the services described in this document as scribed by the above individual and it is both accurate and complete. Casey Jang MD 01/09/24

## 2024-01-10 ENCOUNTER — OFFICE VISIT (OUTPATIENT)
Dept: INTERNAL MEDICINE | Facility: CLINIC | Age: 49
End: 2024-01-10
Payer: COMMERCIAL

## 2024-01-10 VITALS
WEIGHT: 160.8 LBS | HEART RATE: 73 BPM | BODY MASS INDEX: 26.79 KG/M2 | OXYGEN SATURATION: 97 % | HEIGHT: 65 IN | TEMPERATURE: 98.2 F | SYSTOLIC BLOOD PRESSURE: 112 MMHG | DIASTOLIC BLOOD PRESSURE: 75 MMHG

## 2024-01-10 DIAGNOSIS — Z00.00 ANNUAL PHYSICAL EXAM: Primary | ICD-10-CM

## 2024-01-10 DIAGNOSIS — Z90.49 HX OF CHOLECYSTECTOMY: ICD-10-CM

## 2024-01-10 DIAGNOSIS — O99.119 PROTEIN C DEFICIENCY COMPLICATING PREGNANCY: ICD-10-CM

## 2024-01-10 DIAGNOSIS — D68.59 PROTEIN C DEFICIENCY COMPLICATING PREGNANCY: ICD-10-CM

## 2024-01-10 DIAGNOSIS — K58.9 IRRITABLE BOWEL SYNDROME WITHOUT DIARRHEA: ICD-10-CM

## 2024-01-10 DIAGNOSIS — I82.90 THROMBOSIS: ICD-10-CM

## 2024-01-10 DIAGNOSIS — D68.59 PROTEIN C DEFICIENCY: ICD-10-CM

## 2024-01-10 DIAGNOSIS — E55.9 VITAMIN D DEFICIENCY: ICD-10-CM

## 2024-01-10 DIAGNOSIS — I82.A22 DVT OF AXILLARY VEIN, CHRONIC LEFT: ICD-10-CM

## 2024-01-10 RX ORDER — CYANOCOBALAMIN (VITAMIN B-12) 5000MCG/ML
DROPS SUBLINGUAL
COMMUNITY

## 2024-01-10 NOTE — PROGRESS NOTES
"CHIEF COMPLAINT/ HPI:  Annual Exam (Pt states that this is routine, she didn't have labs. /Dr. Gross's is wanting to know if you will order FHS, LH and Estradiol)    Patient had some concerns about a left ganglion type cyst on the left dorsum of the wrist, and surgery and risks of blood clots again, discussed briefly about surgery scheduling timing and modifications, as well as anticoagulation following surgery,          Objective   Vital Signs  Vitals:    01/10/24 1500   BP: 112/75   Pulse: 73   Temp: 98.2 °F (36.8 °C)   TempSrc: Skin   SpO2: 97%   Weight: 72.9 kg (160 lb 12.8 oz)   Height: 165.1 cm (65\")      Body mass index is 26.76 kg/m².  Review of Systems   Constitutional: Negative.    HENT: Negative.     Eyes: Negative.    Respiratory: Negative.     Cardiovascular: Negative.    Gastrointestinal: Negative.    Endocrine: Negative.    Genitourinary: Negative.    Musculoskeletal: Negative.    Allergic/Immunologic: Negative.    Neurological: Negative.    Hematological: Negative.    Psychiatric/Behavioral: Negative.        Physical Exam  Constitutional:       General: She is not in acute distress.     Appearance: Normal appearance.   HENT:      Head: Normocephalic.      Mouth/Throat:      Mouth: Mucous membranes are moist.   Eyes:      Conjunctiva/sclera: Conjunctivae normal.      Pupils: Pupils are equal, round, and reactive to light.   Cardiovascular:      Rate and Rhythm: Normal rate and regular rhythm.      Pulses: Normal pulses.      Heart sounds: Normal heart sounds.   Pulmonary:      Effort: Pulmonary effort is normal.      Breath sounds: Normal breath sounds.   Abdominal:      General: Abdomen is flat. Bowel sounds are normal.      Palpations: Abdomen is soft.   Musculoskeletal:         General: No swelling. Normal range of motion.      Cervical back: Neck supple.   Skin:     General: Skin is warm and dry.      Coloration: Skin is not jaundiced.   Neurological:      General: No focal deficit present.      " "Mental Status: She is alert and oriented to person, place, and time. Mental status is at baseline.   Psychiatric:         Mood and Affect: Mood normal.         Behavior: Behavior normal.         Thought Content: Thought content normal.         Judgment: Judgment normal.        Result Review :   No results found for: \"PROBNP\", \"BNP\"          Lab Results   Component Value Date    TSH 1.130 08/13/2021    TSH 3.110 09/02/2020    TSH 4.670 (H) 12/10/2019      Lab Results   Component Value Date    FREET4 1.13 08/13/2021                          Visit Diagnoses:    ICD-10-CM ICD-9-CM   1. Annual physical exam  Z00.00 V70.0   2. Thrombosis  I82.90 453.9   3. Vitamin D deficiency  E55.9 268.9   4. Irritable bowel syndrome without diarrhea  K58.9 564.1   5. Protein C deficiency complicating pregnancy  O99.119 649.30    D68.59 289.81   6. DVT of axillary vein, chronic left  I82.A22 453.74   7. Protein C deficiency  D68.59 289.81   8. Hx of cholecystectomy  Z90.49 V45.79       Assessment and Plan   Diagnoses and all orders for this visit:    1. Annual physical exam (Primary)  -     FSH & LH; Future  -     Estradiol; Future  -     Comprehensive Metabolic Panel; Future  -     CBC & Differential; Future  -     Lipid Panel; Future  -     Vitamin D,25-Hydroxy; Future  -     Vitamin B12 anemia; Future  -     Folate anemia; Future  -     TSH+Free T4; Future    2. Thrombosis  -     FSH & LH; Future  -     Estradiol; Future  -     Comprehensive Metabolic Panel; Future  -     CBC & Differential; Future  -     Lipid Panel; Future  -     Vitamin D,25-Hydroxy; Future  -     Vitamin B12 anemia; Future  -     Folate anemia; Future  -     TSH+Free T4; Future    3. Vitamin D deficiency  -     FSH & LH; Future  -     Estradiol; Future  -     Comprehensive Metabolic Panel; Future  -     CBC & Differential; Future  -     Lipid Panel; Future  -     Vitamin D,25-Hydroxy; Future  -     Vitamin B12 anemia; Future  -     Folate anemia; Future  -     " TSH+Free T4; Future    4. Irritable bowel syndrome without diarrhea  -     FSH & LH; Future  -     Estradiol; Future  -     Comprehensive Metabolic Panel; Future  -     CBC & Differential; Future  -     Lipid Panel; Future  -     Vitamin D,25-Hydroxy; Future  -     Vitamin B12 anemia; Future  -     Folate anemia; Future  -     TSH+Free T4; Future    5. Protein C deficiency complicating pregnancy  -     FSH & LH; Future  -     Estradiol; Future  -     Comprehensive Metabolic Panel; Future  -     CBC & Differential; Future  -     Lipid Panel; Future  -     Vitamin D,25-Hydroxy; Future  -     Vitamin B12 anemia; Future  -     Folate anemia; Future  -     TSH+Free T4; Future    6. DVT of axillary vein, chronic left  -     FSH & LH; Future  -     Estradiol; Future  -     Comprehensive Metabolic Panel; Future  -     CBC & Differential; Future  -     Lipid Panel; Future  -     Vitamin D,25-Hydroxy; Future  -     Vitamin B12 anemia; Future  -     Folate anemia; Future  -     TSH+Free T4; Future    7. Protein C deficiency  -     FSH & LH; Future  -     Estradiol; Future  -     Comprehensive Metabolic Panel; Future  -     CBC & Differential; Future  -     Lipid Panel; Future  -     Vitamin D,25-Hydroxy; Future  -     Vitamin B12 anemia; Future  -     Folate anemia; Future  -     TSH+Free T4; Future    8. Hx of cholecystectomy  -     FSH & LH; Future  -     Estradiol; Future  -     Comprehensive Metabolic Panel; Future  -     CBC & Differential; Future  -     Lipid Panel; Future  -     Vitamin D,25-Hydroxy; Future  -     Vitamin B12 anemia; Future  -     Folate anemia; Future  -     TSH+Free T4; Future    Menopause symptoms, will check FSH estradiol and LH levels, January 10, 2024    Continues acupuncture August 2022  for digestive issues      Preventive measures discussed, patient wears her seatbelt does not smoke or drink she exercises regularly, jan 2024     ABD PAIN, DYSPEPSIA,  R/O ALPHA GAL--- doing acupuncture, with  marked improvement, ----------released from  GI service Shwetha Gifford at Dr. Correia's office,   Did do barium swallow test aug 2021 patient gastric emptying study May 2021 normal,, esophagram showed a small sliding-type hiatal hernia, June 2021     Acute DVT left upper extremity -------etiology ----unclear most likely due to trauma from recent colonoscopy and procedure,, AUG 2019, -------, Chest x-ray showed no active disease, echocardiogram August 2019 showed trace mitral, tricuspid regurgitation normal ejection fraction limited study otherwise,-----------off Eliquis February 2020    oncology hematology for second opinion with history of protein C deficiency and blood clot and length of anticoagulation,  Eliquis 5 mg twice a day, since August 19, 2019,----OFF NOW    IBS---Abdominal pains, associated shortness of breath lasting 45 minutes Friday night, associated after a meal restaurant, symptoms resolved and not recurred, discussion with treatment workup etc. previous CT scan September 2018 did not show any significant findings, S/P EGD , SCOPE, AUG 2019, PIPPA , --STARTED ON HYOSCYAMINE / METAMUCIL, COLACE---MARCH 2020, ---PRIOR W/U WITH EGD SCOPE 2011 WITH GI IN [place] -------alpha gal work-up August 2021 was negative, other lab studies including inflammatory markers liver tests all normal iron studies low normal, thyroid levels normal,    Elevated cholesterol     [facility] 2008, DR ELIZABETH OROSCO AS TEENAGER, RESOLVED    PROT. C DEF, ----- cont ASA    FHX OF RENAL CELL CA    Allergies, --cont zyrtec daily ,Benadryl nightly, nasocort       Follow Up   Return in about 1 year (around 1/10/2025).  Patient was given instructions and counseling regarding her condition or for health maintenance advice. Please see specific information pulled into the AVS if appropriate.         Answers submitted by the patient for this visit:  Other (Submitted on 1/10/2024)  Please describe your symptoms.: No symptons. Just a  check up.  Have you had these symptoms before?: No  How long have you been having these symptoms?: 1-4 days  Please list any medications you are currently taking for this condition.: NA  Please describe any probable cause for these symptoms. : NA  Primary Reason for Visit (Submitted on 1/10/2024)  What is the primary reason for your visit?: Other

## 2024-01-12 ENCOUNTER — TELEPHONE (OUTPATIENT)
Dept: ORTHOPEDIC SURGERY | Facility: CLINIC | Age: 49
End: 2024-01-12
Payer: COMMERCIAL

## 2024-01-12 NOTE — TELEPHONE ENCOUNTER
PATIENT WAS CALLED AND MESSAGE WAS LEFT FOR PATIENT TO CALL ME AT THE OFFICE.  PER DR MATAMOROS PATIENT CAN STOP/HOLD ASPIRIN FOR 7 DAYS PRIOR TO SURGERY.

## 2024-01-17 ENCOUNTER — TELEPHONE (OUTPATIENT)
Dept: ORTHOPEDIC SURGERY | Facility: CLINIC | Age: 49
End: 2024-01-17
Payer: COMMERCIAL

## 2024-01-17 NOTE — TELEPHONE ENCOUNTER
Hub staff attempted to follow warm transfer process and was unsuccessful     Caller: Norma Cabrales    Relationship to patient: Self    Best call back number: [phone] (home)   [phone] CELL      Patient is needing: TO CANCEL SURGERY FOR CYST ON HER WRIST FOR MONDAY 22, 2024.     PATIENT WILL MAKE AN APPOINTMENT TO HAVE HER WRIST DRAINED AT A LATER DATE.           31-Dec-2023 11:56

## 2024-01-17 NOTE — TELEPHONE ENCOUNTER
ATTEMPTED TO CALL PATIENT, NO ANSWER, LV FOR PATIENT STATING SURGERY FOR 1/22/2024 HAS BEEN CANCELLED AND IF SHE WISHES TO RESCHEDULE IN THE FUTURE TO GIVE THE OFFICE A CALL.

## 2024-02-13 ENCOUNTER — HOSPITAL ENCOUNTER (OUTPATIENT)
Dept: MAMMOGRAPHY | Facility: HOSPITAL | Age: 49
Discharge: HOME | End: 2024-02-13
Admitting: OBSTETRICS & GYNECOLOGY
Payer: COMMERCIAL

## 2024-02-13 DIAGNOSIS — Z12.31 SCREENING MAMMOGRAM, ENCOUNTER FOR: ICD-10-CM

## 2024-02-13 PROCEDURE — 77063 BREAST TOMOSYNTHESIS BI: CPT

## 2024-02-13 PROCEDURE — 77067 SCR MAMMO BI INCL CAD: CPT
